# Patient Record
Sex: FEMALE | Race: WHITE | Employment: UNEMPLOYED | URBAN - METROPOLITAN AREA
[De-identification: names, ages, dates, MRNs, and addresses within clinical notes are randomized per-mention and may not be internally consistent; named-entity substitution may affect disease eponyms.]

---

## 2017-11-07 ENCOUNTER — HOSPITAL ENCOUNTER (INPATIENT)
Age: 82
LOS: 3 days | Discharge: NURSING, REHAB OR LONG-TERM CARE | DRG: 641 | End: 2017-11-10
Attending: EMERGENCY MEDICINE | Admitting: HOSPITALIST
Payer: MEDICARE

## 2017-11-07 ENCOUNTER — APPOINTMENT (OUTPATIENT)
Dept: CT IMAGING | Age: 82
DRG: 641 | End: 2017-11-07
Attending: EMERGENCY MEDICINE
Payer: MEDICARE

## 2017-11-07 ENCOUNTER — APPOINTMENT (OUTPATIENT)
Dept: GENERAL RADIOLOGY | Age: 82
DRG: 641 | End: 2017-11-07
Attending: EMERGENCY MEDICINE
Payer: MEDICARE

## 2017-11-07 DIAGNOSIS — I48.20 CHRONIC ATRIAL FIBRILLATION (HCC): ICD-10-CM

## 2017-11-07 DIAGNOSIS — E86.0 SEVERE DEHYDRATION: ICD-10-CM

## 2017-11-07 DIAGNOSIS — C80.1 CANCER (HCC): ICD-10-CM

## 2017-11-07 DIAGNOSIS — R55 SYNCOPE AND COLLAPSE: Primary | ICD-10-CM

## 2017-11-07 DIAGNOSIS — E87.20 LACTIC ACIDOSIS: ICD-10-CM

## 2017-11-07 DIAGNOSIS — D72.828 OTHER ELEVATED WHITE BLOOD CELL (WBC) COUNT: ICD-10-CM

## 2017-11-07 DIAGNOSIS — R11.2 INTRACTABLE VOMITING WITH NAUSEA, UNSPECIFIED VOMITING TYPE: ICD-10-CM

## 2017-11-07 PROBLEM — C78.7 METASTASIS TO LIVER (HCC): Status: ACTIVE | Noted: 2017-11-07

## 2017-11-07 PROBLEM — C16.9 ADENOCARCINOMA OF STOMACH (HCC): Status: ACTIVE | Noted: 2017-11-07

## 2017-11-07 PROBLEM — N39.0 UTI (URINARY TRACT INFECTION): Status: ACTIVE | Noted: 2017-11-07

## 2017-11-07 PROBLEM — E44.0 MODERATE PROTEIN-CALORIE MALNUTRITION (HCC): Status: ACTIVE | Noted: 2017-11-07

## 2017-11-07 LAB
ALBUMIN SERPL-MCNC: 2.6 G/DL (ref 3.4–5)
ALBUMIN/GLOB SERPL: 0.7 {RATIO} (ref 0.8–1.7)
ALP SERPL-CCNC: 176 U/L (ref 45–117)
ALT SERPL-CCNC: 78 U/L (ref 13–56)
ANION GAP SERPL CALC-SCNC: 17 MMOL/L (ref 3–18)
APPEARANCE UR: ABNORMAL
AST SERPL-CCNC: 102 U/L (ref 15–37)
BACTERIA URNS QL MICRO: ABNORMAL /HPF
BASOPHILS # BLD: 0 K/UL (ref 0–0.06)
BASOPHILS NFR BLD: 0 % (ref 0–2)
BILIRUB SERPL-MCNC: 0.3 MG/DL (ref 0.2–1)
BILIRUB UR QL: NEGATIVE
BUN SERPL-MCNC: 27 MG/DL (ref 7–18)
BUN/CREAT SERPL: 29 (ref 12–20)
CALCIUM SERPL-MCNC: 8.7 MG/DL (ref 8.5–10.1)
CHLORIDE SERPL-SCNC: 95 MMOL/L (ref 100–108)
CK MB CFR SERPL CALC: ABNORMAL % (ref 0–4)
CK MB SERPL-MCNC: <1 NG/ML (ref 5–25)
CK SERPL-CCNC: 62 U/L (ref 26–192)
CO2 SERPL-SCNC: 26 MMOL/L (ref 21–32)
COLOR UR: ABNORMAL
CREAT SERPL-MCNC: 0.94 MG/DL (ref 0.6–1.3)
DIFFERENTIAL METHOD BLD: ABNORMAL
EOSINOPHIL # BLD: 0.2 K/UL (ref 0–0.4)
EOSINOPHIL NFR BLD: 1 % (ref 0–5)
EPITH CASTS URNS QL MICRO: ABNORMAL /LPF (ref 0–5)
ERYTHROCYTE [DISTWIDTH] IN BLOOD BY AUTOMATED COUNT: 18.9 % (ref 11.6–14.5)
GLOBULIN SER CALC-MCNC: 3.6 G/DL (ref 2–4)
GLUCOSE SERPL-MCNC: 259 MG/DL (ref 74–99)
GLUCOSE UR STRIP.AUTO-MCNC: NEGATIVE MG/DL
HCT VFR BLD AUTO: 35.4 % (ref 35–45)
HGB BLD-MCNC: 11.1 G/DL (ref 12–16)
HGB UR QL STRIP: NEGATIVE
KETONES UR QL STRIP.AUTO: NEGATIVE MG/DL
LACTATE SERPL-SCNC: 5.4 MMOL/L (ref 0.4–2)
LACTATE SERPL-SCNC: 9.4 MMOL/L (ref 0.4–2)
LEUKOCYTE ESTERASE UR QL STRIP.AUTO: ABNORMAL
LYMPHOCYTES # BLD: 2.8 K/UL (ref 0.9–3.6)
LYMPHOCYTES NFR BLD: 16 % (ref 21–52)
MCH RBC QN AUTO: 28 PG (ref 24–34)
MCHC RBC AUTO-ENTMCNC: 31.4 G/DL (ref 31–37)
MCV RBC AUTO: 89.4 FL (ref 74–97)
MONOCYTES # BLD: 1.1 K/UL (ref 0.05–1.2)
MONOCYTES NFR BLD: 6 % (ref 3–10)
NEUTS SEG # BLD: 13.9 K/UL (ref 1.8–8)
NEUTS SEG NFR BLD: 77 % (ref 40–73)
NITRITE UR QL STRIP.AUTO: NEGATIVE
PH UR STRIP: 8.5 [PH] (ref 5–8)
PLATELET # BLD AUTO: 229 K/UL (ref 135–420)
PMV BLD AUTO: 9.2 FL (ref 9.2–11.8)
POTASSIUM SERPL-SCNC: 3.8 MMOL/L (ref 3.5–5.5)
PROT SERPL-MCNC: 6.2 G/DL (ref 6.4–8.2)
PROT UR STRIP-MCNC: 100 MG/DL
RBC # BLD AUTO: 3.96 M/UL (ref 4.2–5.3)
RBC #/AREA URNS HPF: ABNORMAL /HPF (ref 0–5)
SODIUM SERPL-SCNC: 138 MMOL/L (ref 136–145)
SP GR UR REFRACTOMETRY: 1.02 (ref 1–1.03)
TROPONIN I SERPL-MCNC: 0.09 NG/ML (ref 0–0.06)
UROBILINOGEN UR QL STRIP.AUTO: 1 EU/DL (ref 0.2–1)
WBC # BLD AUTO: 18.1 K/UL (ref 4.6–13.2)
WBC URNS QL MICRO: ABNORMAL /HPF (ref 0–5)

## 2017-11-07 PROCEDURE — 51701 INSERT BLADDER CATHETER: CPT

## 2017-11-07 PROCEDURE — 74011250636 HC RX REV CODE- 250/636: Performed by: EMERGENCY MEDICINE

## 2017-11-07 PROCEDURE — 70450 CT HEAD/BRAIN W/O DYE: CPT

## 2017-11-07 PROCEDURE — 93005 ELECTROCARDIOGRAM TRACING: CPT

## 2017-11-07 PROCEDURE — 96367 TX/PROPH/DG ADDL SEQ IV INF: CPT

## 2017-11-07 PROCEDURE — 87086 URINE CULTURE/COLONY COUNT: CPT | Performed by: EMERGENCY MEDICINE

## 2017-11-07 PROCEDURE — 87186 SC STD MICRODIL/AGAR DIL: CPT | Performed by: EMERGENCY MEDICINE

## 2017-11-07 PROCEDURE — 99285 EMERGENCY DEPT VISIT HI MDM: CPT

## 2017-11-07 PROCEDURE — 83605 ASSAY OF LACTIC ACID: CPT | Performed by: EMERGENCY MEDICINE

## 2017-11-07 PROCEDURE — 96366 THER/PROPH/DIAG IV INF ADDON: CPT

## 2017-11-07 PROCEDURE — 87077 CULTURE AEROBIC IDENTIFY: CPT | Performed by: EMERGENCY MEDICINE

## 2017-11-07 PROCEDURE — 81001 URINALYSIS AUTO W/SCOPE: CPT | Performed by: EMERGENCY MEDICINE

## 2017-11-07 PROCEDURE — 80053 COMPREHEN METABOLIC PANEL: CPT | Performed by: EMERGENCY MEDICINE

## 2017-11-07 PROCEDURE — 74011000258 HC RX REV CODE- 258: Performed by: EMERGENCY MEDICINE

## 2017-11-07 PROCEDURE — 65270000029 HC RM PRIVATE

## 2017-11-07 PROCEDURE — 74011250636 HC RX REV CODE- 250/636: Performed by: HOSPITALIST

## 2017-11-07 PROCEDURE — 96365 THER/PROPH/DIAG IV INF INIT: CPT

## 2017-11-07 PROCEDURE — 85025 COMPLETE CBC W/AUTO DIFF WBC: CPT | Performed by: EMERGENCY MEDICINE

## 2017-11-07 PROCEDURE — 71010 XR CHEST PORT: CPT

## 2017-11-07 PROCEDURE — 82550 ASSAY OF CK (CPK): CPT | Performed by: EMERGENCY MEDICINE

## 2017-11-07 PROCEDURE — 77030005563 HC CATH URETH INT MMGH -A

## 2017-11-07 PROCEDURE — 74011000258 HC RX REV CODE- 258: Performed by: HOSPITALIST

## 2017-11-07 PROCEDURE — 87040 BLOOD CULTURE FOR BACTERIA: CPT | Performed by: EMERGENCY MEDICINE

## 2017-11-07 PROCEDURE — 74011250637 HC RX REV CODE- 250/637: Performed by: HOSPITALIST

## 2017-11-07 PROCEDURE — 96361 HYDRATE IV INFUSION ADD-ON: CPT

## 2017-11-07 RX ORDER — SODIUM CHLORIDE 9 MG/ML
125 INJECTION, SOLUTION INTRAVENOUS CONTINUOUS
Status: DISCONTINUED | OUTPATIENT
Start: 2017-11-07 | End: 2017-11-10 | Stop reason: HOSPADM

## 2017-11-07 RX ORDER — CALCIUM CARBONATE 200(500)MG
1 TABLET,CHEWABLE ORAL 2 TIMES DAILY
COMMUNITY

## 2017-11-07 RX ORDER — SUCRALFATE 1 G/10ML
1 SUSPENSION ORAL 4 TIMES DAILY
Status: DISCONTINUED | OUTPATIENT
Start: 2017-11-07 | End: 2017-11-10 | Stop reason: HOSPADM

## 2017-11-07 RX ORDER — ERYTHROMYCIN ETHYLSUCCINATE 200 MG/5ML
25 SUSPENSION ORAL
Status: DISCONTINUED | OUTPATIENT
Start: 2017-11-07 | End: 2017-11-10 | Stop reason: HOSPADM

## 2017-11-07 RX ORDER — PANTOPRAZOLE SODIUM 40 MG/1
40 TABLET, DELAYED RELEASE ORAL DAILY
Status: DISCONTINUED | OUTPATIENT
Start: 2017-11-08 | End: 2017-11-08

## 2017-11-07 RX ORDER — PROMETHAZINE HYDROCHLORIDE 12.5 MG/1
12.5 TABLET ORAL 2 TIMES DAILY
COMMUNITY

## 2017-11-07 RX ORDER — ENOXAPARIN SODIUM 100 MG/ML
40 INJECTION SUBCUTANEOUS EVERY 24 HOURS
Status: DISCONTINUED | OUTPATIENT
Start: 2017-11-07 | End: 2017-11-10 | Stop reason: HOSPADM

## 2017-11-07 RX ORDER — ONDANSETRON 8 MG/1
8 TABLET, ORALLY DISINTEGRATING ORAL
COMMUNITY

## 2017-11-07 RX ORDER — RABEPRAZOLE SODIUM 20 MG/1
10 TABLET, DELAYED RELEASE ORAL DAILY
COMMUNITY

## 2017-11-07 RX ORDER — URSODIOL 300 MG/1
300 CAPSULE ORAL DAILY
COMMUNITY

## 2017-11-07 RX ORDER — URSODIOL 300 MG/1
300 CAPSULE ORAL DAILY
Status: DISCONTINUED | OUTPATIENT
Start: 2017-11-08 | End: 2017-11-10 | Stop reason: HOSPADM

## 2017-11-07 RX ORDER — SODIUM CHLORIDE 0.9 % (FLUSH) 0.9 %
5-10 SYRINGE (ML) INJECTION AS NEEDED
Status: DISCONTINUED | OUTPATIENT
Start: 2017-11-07 | End: 2017-11-10 | Stop reason: HOSPADM

## 2017-11-07 RX ORDER — SUCRALFATE 1 G/10ML
1 SUSPENSION ORAL 4 TIMES DAILY
COMMUNITY

## 2017-11-07 RX ORDER — SODIUM CHLORIDE 0.9 % (FLUSH) 0.9 %
5-10 SYRINGE (ML) INJECTION EVERY 8 HOURS
Status: DISCONTINUED | OUTPATIENT
Start: 2017-11-07 | End: 2017-11-10 | Stop reason: HOSPADM

## 2017-11-07 RX ORDER — SODIUM CHLORIDE 9 MG/ML
250 INJECTION, SOLUTION INTRAVENOUS ONCE
Status: COMPLETED | OUTPATIENT
Start: 2017-11-07 | End: 2017-11-07

## 2017-11-07 RX ADMIN — CEFTRIAXONE 1 G: 1 INJECTION, POWDER, FOR SOLUTION INTRAMUSCULAR; INTRAVENOUS at 18:57

## 2017-11-07 RX ADMIN — Medication 10 ML: at 18:32

## 2017-11-07 RX ADMIN — SUCRALFATE 1 G: 1 SUSPENSION ORAL at 21:19

## 2017-11-07 RX ADMIN — SODIUM CHLORIDE 250 ML/HR: 900 INJECTION, SOLUTION INTRAVENOUS at 10:00

## 2017-11-07 RX ADMIN — SODIUM CHLORIDE 1000 ML: 900 INJECTION, SOLUTION INTRAVENOUS at 10:08

## 2017-11-07 RX ADMIN — ENOXAPARIN SODIUM 40 MG: 40 INJECTION SUBCUTANEOUS at 18:31

## 2017-11-07 RX ADMIN — SODIUM CHLORIDE 125 ML/HR: 900 INJECTION, SOLUTION INTRAVENOUS at 18:30

## 2017-11-07 RX ADMIN — SODIUM CHLORIDE 250 ML/HR: 900 INJECTION, SOLUTION INTRAVENOUS at 09:57

## 2017-11-07 RX ADMIN — SODIUM CHLORIDE 1578 ML: 900 INJECTION, SOLUTION INTRAVENOUS at 07:34

## 2017-11-07 RX ADMIN — SUCRALFATE 1 G: 1 SUSPENSION ORAL at 18:31

## 2017-11-07 RX ADMIN — SODIUM CHLORIDE 1000 MG: 900 INJECTION, SOLUTION INTRAVENOUS at 08:13

## 2017-11-07 RX ADMIN — PIPERACILLIN SODIUM,TAZOBACTAM SODIUM 4.5 G: 4; .5 INJECTION, POWDER, FOR SOLUTION INTRAVENOUS at 07:37

## 2017-11-07 RX ADMIN — SODIUM CHLORIDE 1000 ML: 900 INJECTION, SOLUTION INTRAVENOUS at 06:11

## 2017-11-07 RX ADMIN — ERYTHROMYCIN ETHYLSUCCINATE 25 MG: 200 GRANULE, FOR SUSPENSION ORAL at 18:57

## 2017-11-07 NOTE — ED NOTES
Checked patients brief. Pt has not voided. Bladder scan performed, pt with less than 200ml urine in bladder.

## 2017-11-07 NOTE — ED NOTES
Pt resting quietly in bed. Warm blankets provided for comfort. Son at bedside with her. Rails up for safety. Call bell within reach. VSS on O2.

## 2017-11-07 NOTE — ROUTINE PROCESS
TRANSFER - IN REPORT:    Verbal report received from Alphonso Garcia RN(name) on Alejandro   being received from ED(unit) for routine progression of care      Report consisted of patients Situation, Background, Assessment and   Recommendations(SBAR). Information from the following report(s) SBAR, Kardex, Intake/Output and MAR was reviewed with the receiving nurse. Opportunity for questions and clarification was provided. Assessment completed upon patients arrival to unit and care assumed.

## 2017-11-07 NOTE — ED PROVIDER NOTES
[address]  EMERGENCY DEPARTMENT HISTORY AND PHYSICAL EXAM       Date: 11/7/2017   Patient Name: Jennifer Pope   YOB: 1932  Medical Record Number: [id number]    History of Presenting Illness     Chief Complaint   Patient presents with    Syncope        History Provided By:  EMS, patient    Additional History: 5:46 AM   Jennifer Pope is a 80 y.o. female who presents to the emergency department via EMS C/O syncope episode for about 20 seconds witnessed by nurse at [facility] onset PTA while walking to the restroom. Associated sxs include diaphoresis, vomiting, diarrhea and pale color to skin. Pt has a feeding tube but states she cannot eat anything because she will vomit. PMHx includes malignant stomach CA (dx'd 2 months ago), hypokalemia and dehydration. PSHx includes peg tube. Pt denies myalgias, arthralgias, abd pain and any other symptoms or complaints. Written by Sasha Bang ED Scribe, as dictated by Kj Giron MD     Primary Care Provider: Shade Gomez MD   Specialist:    Past History     Past Medical History:   Past Medical History:   Diagnosis Date    Dehydration     Dysphagia     Hypokalemia     Malignant neoplasm of fundus of stomach ([address]         Past Surgical History:   Past Surgical History:   Procedure Laterality Date    HX CHOLECYSTECTOMY      HX GI      peg tube    HX ORTHOPAEDIC          Family History:   History reviewed. No pertinent family history. Social History:   Social History   Substance Use Topics    Smoking status: Never Smoker    Smokeless tobacco: Never Used    Alcohol use No        Allergies: Allergies   Allergen Reactions    Codeine Nausea Only    Phenobarbital Rash        Review of Systems   Review of Systems   Constitutional: Positive for diaphoresis. Gastrointestinal: Positive for diarrhea and vomiting. Negative for abdominal pain. Musculoskeletal: Negative for arthralgias and myalgias. Skin: Positive for color change (pale). Neurological: Positive for syncope. All other systems reviewed and are negative. Physical Exam  Vitals:    11/07/17 0730 11/07/17 0745 11/07/17 0800 11/07/17 0815   BP: 110/63 111/70 108/55 103/70   Pulse: (!) 112 (!) 115 (!) 108 (!) 109   Resp: 25 20 23 21   Temp:       SpO2: 99% 99% 99% 99%   Weight:           Physical Exam   Constitutional: She is oriented to person, place, and time. She appears well-developed and well-nourished. No distress. fraile   elderly      HENT:   Head: Normocephalic and atraumatic. Eyes: Pupils are equal, round, and reactive to light. Neck: Neck supple. Cardiovascular: Regular rhythm, S1 normal, S2 normal and normal heart sounds. Tachycardia present. Pulmonary/Chest: Breath sounds normal. No respiratory distress. She has no wheezes. She has no rales. She exhibits no tenderness. Abdominal: Soft. She exhibits no distension and no mass. There is no tenderness. There is no guarding. G-Tube in epigastric area    Musculoskeletal: Normal range of motion. She exhibits no edema or tenderness. No swelling in any extremities. Easily bruising in UE. Neurological: She is alert and oriented to person, place, and time. No cranial nerve deficit. Cranical nerves II-XXII intact. Generalied weakness. Skin: No rash noted. There is pallor. Psychiatric: She has a normal mood and affect. Her behavior is normal. Thought content normal.   Nursing note and vitals reviewed.       Diagnostic Study Results     Labs -      Recent Results (from the past 12 hour(s))   EKG, 12 LEAD, INITIAL    Collection Time: 11/07/17  5:53 AM   Result Value Ref Range    Ventricular Rate 124 BPM    Atrial Rate 124 BPM    P-R Interval 96 ms    QRS Duration 62 ms    Q-T Interval 318 ms    QTC Calculation (Bezet) 456 ms    Calculated P Axis 73 degrees    Calculated R Axis 47 degrees    Calculated T Axis 96 degrees    Diagnosis       Sinus tachycardia with short MT  Marked ST abnormality, possible inferolateral subendocardial injury  Abnormal ECG  No previous ECGs available     CARDIAC PANEL,(CK, CKMB & TROPONIN)    Collection Time: 11/07/17  6:05 AM   Result Value Ref Range    CK 62 26 - 192 U/L    CK - MB <1.0 <3.6 ng/ml    CK-MB Index  0.0 - 4.0 %     CALCULATION NOT PERFORMED WHEN RESULT IS BELOW LINEAR LIMIT    Troponin-I, Qt. 0.09 (H) 0.00 - 0.06 NG/ML   CBC WITH AUTOMATED DIFF    Collection Time: 11/07/17  6:05 AM   Result Value Ref Range    WBC 18.1 (H) 4.6 - 13.2 K/uL    RBC 3.96 (L) 4.20 - 5.30 M/uL    HGB 11.1 (L) 12.0 - 16.0 g/dL    HCT 35.4 35.0 - 45.0 %    MCV 89.4 74.0 - 97.0 FL    MCH 28.0 24.0 - 34.0 PG    MCHC 31.4 31.0 - 37.0 g/dL    RDW 18.9 (H) 11.6 - 14.5 %    PLATELET 000 539 - 811 K/uL    MPV 9.2 9.2 - 11.8 FL    NEUTROPHILS 77 (H) 40 - 73 %    LYMPHOCYTES 16 (L) 21 - 52 %    MONOCYTES 6 3 - 10 %    EOSINOPHILS 1 0 - 5 %    BASOPHILS 0 0 - 2 %    ABS. NEUTROPHILS 13.9 (H) 1.8 - 8.0 K/UL    ABS. LYMPHOCYTES 2.8 0.9 - 3.6 K/UL    ABS. MONOCYTES 1.1 0.05 - 1.2 K/UL    ABS. EOSINOPHILS 0.2 0.0 - 0.4 K/UL    ABS. BASOPHILS 0.0 0.0 - 0.06 K/UL    DF AUTOMATED     METABOLIC PANEL, COMPREHENSIVE    Collection Time: 11/07/17  6:05 AM   Result Value Ref Range    Sodium 138 136 - 145 mmol/L    Potassium 3.8 3.5 - 5.5 mmol/L    Chloride 95 (L) 100 - 108 mmol/L    CO2 26 21 - 32 mmol/L    Anion gap 17 3.0 - 18 mmol/L    Glucose 259 (H) 74 - 99 mg/dL    BUN 27 (H) 7.0 - 18 MG/DL    Creatinine 0.94 0.6 - 1.3 MG/DL    BUN/Creatinine ratio 29 (H) 12 - 20      GFR est AA >60 >60 ml/min/1.73m2    GFR est non-AA 57 (L) >60 ml/min/1.73m2    Calcium 8.7 8.5 - 10.1 MG/DL    Bilirubin, total 0.3 0.2 - 1.0 MG/DL    ALT (SGPT) 78 (H) 13 - 56 U/L    AST (SGOT) 102 (H) 15 - 37 U/L    Alk.  phosphatase 176 (H) 45 - 117 U/L    Protein, total 6.2 (L) 6.4 - 8.2 g/dL    Albumin 2.6 (L) 3.4 - 5.0 g/dL    Globulin 3.6 2.0 - 4.0 g/dL    A-G Ratio 0.7 (L) 0.8 - 1.7     LACTIC ACID Collection Time: 11/07/17  6:05 AM   Result Value Ref Range    Lactic acid 9.4 (HH) 0.4 - 2.0 MMOL/L   URINALYSIS W/ RFLX MICROSCOPIC    Collection Time: 11/07/17  7:18 AM   Result Value Ref Range    Color DARK YELLOW      Appearance CLOUDY      Specific gravity 1.023 1.005 - 1.030      pH (UA) 8.5 (H) 5.0 - 8.0      Protein 100 (A) NEG mg/dL    Glucose NEGATIVE  NEG mg/dL    Ketone NEGATIVE  NEG mg/dL    Bilirubin NEGATIVE  NEG      Blood NEGATIVE  NEG      Urobilinogen 1.0 0.2 - 1.0 EU/dL    Nitrites NEGATIVE  NEG      Leukocyte Esterase SMALL (A) NEG     URINE MICROSCOPIC ONLY    Collection Time: 11/07/17  7:18 AM   Result Value Ref Range    WBC 11 to 20 0 - 5 /hpf    RBC 0 to 2 0 - 5 /hpf    Epithelial cells FEW 0 - 5 /lpf    Bacteria 3+ (A) NEG /hpf   LACTIC ACID    Collection Time: 11/07/17  8:40 AM   Result Value Ref Range    Lactic acid 5.4 (HH) 0.4 - 2.0 MMOL/L       Radiologic Studies -  The following have been ordered and reviewed:  XR CHEST PORT   Final Result   IMPRESSION:        1. No acute intracranial abnormality. 2. Subcortical and periventricular white matter hypoattenuation; a nonspecific  finding, likely reflective of chronic ischemic microvascular change.      As read by the radiologist.      CT HEAD WO CONT   Final Result   IMPRESSION:      1. No acute intracranial abnormality. 2. Subcortical and periventricular white matter hypoattenuation; a nonspecific  finding, likely reflective of chronic ischemic microvascular change. As read by the radiologist.     Medical Decision Making   I am the first provider for this patient. I reviewed the vital signs, available nursing notes, past medical history, past surgical history, family history and social history. Vital Signs-Reviewed the patient's vital signs.    Patient Vitals for the past 12 hrs:   Temp Pulse Resp BP SpO2   11/07/17 0815 - (!) 109 21 103/70 99 %   11/07/17 0800 - (!) 108 23 108/55 99 %   11/07/17 0745 - (!) 115 20 111/70 99 %   11/07/17 0730 - (!) 112 25 110/63 99 %   11/07/17 0715 - 99 - 101/56 -   11/07/17 0710 - (!) 107 22 107/52 (!) 82 %   11/07/17 0705 - (!) 110 30 103/53 97 %   11/07/17 0700 - (!) 106 23 100/57 100 %   11/07/17 0655 - 100 21 101/57 96 %   11/07/17 0650 - (!) 105 27 110/60 98 %   11/07/17 0630 - (!) 114 27 92/58 97 %   11/07/17 0628 - (!) 113 26 (!) 71/47 98 %   11/07/17 0626 - (!) 105 (!) 33 (!) 80/53 -   11/07/17 0624 - (!) 105 (!) 37 95/51 100 %   11/07/17 0615 - (!) 122 (!) 32 94/55 100 %   11/07/17 0600 - (!) 126 26 (!) 81/63 -   11/07/17 0554 97.2 °F (36.2 °C) (!) 124 21 110/44 92 %       Pulse Oximetry Analysis - Normal 92% on RA     Cardiac Monitor:   Rate: 124 bpm  Rhythm: Sinus Tachycardia     EKG interpretation: (EMS)  6:25 AM   Rhythm: sinus tachycardia   Rate: 114 bpm  EKG read by Niyah Araujo MD  at 5:50 AM     EKG interpretation: (Preliminary)  Rhythm: sinus tachycardia with short MD. Rate (approx.): 124 bpm; marked ST abnormality possible inferolateral subendocardial injury    EKG read by Niyah Araujo MD  at 5:53 AM    Old Medical Records: Old medical records. Nursing notes. Ambulance run sheet. Initial impression is syncope. Need to rule out Dehydration, electrolyte abnormalities, metastatic   disease UTI, sepsis      Procedures:   Procedures    ED Course:  5:46 AM  Initial assessment performed. The patients presenting problems have been discussed, and they are in agreement with the care plan formulated and outlined with them. I have encouraged them to ask questions as they arise throughout their visit. SIGN OUT:  7:14 AM  Patient's presentation, labs/imaging and plan of care was reviewed with Dr. Ivan Montana as part of sign out. They will review pending results and reassess pt as part of the plan discussed with the patient.     Dr. Vadim Dye's assistance in completion of this plan is greatly appreciated but it should be noted that I will be the provider of record for this patient. Linda Gregory MD      7:25 AM  Stated that she would be agreeable to resuscitation with fluids and abx should she have worsening sepsis that requires pressers or more invasive management. They would like to reassess the plan since she is a DNR. CONSULT NOTE:   9:42 AM  Lucho Durbin MD spoke with Amanda Crowe MD   Specialty: Hospitalist  Discussed pt's hx, disposition, and available diagnostic and imaging results. Reviewed care plans. Consulting physician agrees with plans as outlined. Accept pt to medical surgery. Written by VAL Veloz, as dictated by Lucho Durbin MD.    Medications Given in the ED:  Medications   vancomycin (VANCOCIN) 1,000 mg in 0.9% sodium chloride (MBP/ADV) 250 mL adv (1,000 mg IntraVENous New Bag 11/7/17 9412)   0.9% sodium chloride infusion (not administered)   sodium chloride 0.9 % bolus infusion 1,000 mL (0 mL IntraVENous IV Completed 11/7/17 7704)   sodium chloride 0.9 % bolus infusion 1,578 mL (0 mL/kg × 52.6 kg IntraVENous IV Completed 11/7/17 2554)   piperacillin-tazobactam (ZOSYN) 4.5 g in 0.9% sodium chloride (MBP/ADV) 100 mL (0 g IntraVENous IV Completed 11/7/17 9943)       ADMISSION NOTE:  9:46 AM  Patient is being admitted to the hospital by Amanda Crowe MD. The results of their tests and reasons for their admission have been discussed with them and/or available family. They convey agreement and understanding for the need to be admitted and for their admission diagnosis. Written by VAL Veloz, as dictated by Lucho Durbin MD.    CONDITIONS ON ADMISSION:  Deep Vein Thrombosis is not present at the time of admission. Thrombosis is not present at the time of admission. Urinary Tract Infection is not present at the time of admission. Pneumonia is not present at the time of admission. MRSA is not present at the time of admission. Wound infection is not present at the time of admission.  Pressure Ulcer is not present at the time of admission. CLINICAL IMPRESSION    1. Syncope and collapse    2. Lactic acidosis    3. Intractable vomiting with nausea, unspecified vomiting type    4. Severe dehydration    5. Chronic atrial fibrillation (HCC)    6. Other elevated white blood cell (WBC) count      Discussion:  80 y.o. F presents for a syncopal event secondary to severe dehydration from her gastric CA and daily vomiting. Has a hx of A-fib and heart rate fluctuates between 90's and 120's however, this improved with IV fluids. Her vitals signs have otherwise been stable. Labs revealed a lactic acid of 9.4 and WBC of 18,000 with no infection source identified. However, she has been covered extensively with vancomycin and Zofran. Lactic acid improved to 5.4 with fluids. Will admit for continued hydration. PLAN: ADMIT TO MEDICAL    _______________________________   Attestations: This note is prepared by Santhosh Kang , acting as a Scribe for Brooke Mathis MD  on 5:46 AM on 11/7/17 . This note is prepared by Rico Kim, acting as a Scribe for MD Brooke Turner MD : The scribe's documentation has been prepared under my direction and personally reviewed by me in its entirety. Jose Luna MD: The scribe's documentation has been prepared under my direction and personally reviewed by me in its entirety.   _______________________________

## 2017-11-07 NOTE — ED TRIAGE NOTES
Pt brought to ED by EMS from [facility] c/c 20 second witnessed syncopal episode. Per EMS caretaker at nursing facility was escorting patient to restroom after tube feeding, reports patient became pale and hypotensive. Upon arrival to ED pt is alert and oriented X4, skin color is pink, pt in NAD, placed on CMx3. Sepsis Screening completed    (xx  )Patient meets SIRS criteria. (  )Patient does not meet SIRS criteria.       SIRS Criteria is achieved when two or more of the following are present   Temperature < 96.8°F (36°C) or > 100.9°F (38.3°C)   Heart Rate > 90 beats per minute   Respiratory Rate > 20 breaths per minute   WBC count > 12,000 or <4,000 or > 10% bands

## 2017-11-07 NOTE — ED NOTES
TRANSFER - OUT REPORT:    Verbal report given to [address] RN(name) on Feliciano Negro  being transferred to Room 317 (unit) for routine progression of care       Report consisted of patients Situation, Background, Assessment and   Recommendations(SBAR). Information from the following report(s) SBAR, Kardex and MAR was reviewed with the receiving nurse. Lines:   Peripheral IV 11/07/17 Right Hand (Active)   Site Assessment Clean, dry, & intact 11/7/2017  8:00 AM   Phlebitis Assessment 0 11/7/2017  8:00 AM   Infiltration Assessment 0 11/7/2017  8:00 AM   Dressing Status Clean, dry, & intact 11/7/2017  8:00 AM   Dressing Type Transparent;Tape 11/7/2017  8:00 AM   Hub Color/Line Status Pink;Flushed;Patent 11/7/2017  8:00 AM   Action Taken Dressing reinforced 11/7/2017  5:55 AM       Peripheral IV 11/07/17 Left Hand (Active)   Site Assessment Clean, dry, & intact 11/7/2017  8:00 AM   Phlebitis Assessment 0 11/7/2017  8:00 AM   Infiltration Assessment 0 11/7/2017  8:00 AM   Dressing Status Clean, dry, & intact 11/7/2017  8:00 AM   Dressing Type Transparent 11/7/2017  8:00 AM   Hub Color/Line Status Blue;Flushed;Patent 11/7/2017  8:00 AM   Action Taken Dressing reinforced 11/7/2017  5:57 AM        Opportunity for questions and clarification was provided.       Patient transported with:   [employer]

## 2017-11-07 NOTE — PROGRESS NOTES
Readmission Risk Assessment: Low Risk and [facility] patients    RRAT Score: 1 - 12    Initial Assessment:Emergency Contact: chart reviewed  Pt admitted via EMS from [facility] for syncope episode,pt diagnosed with severe dehydration,at this time pt waiting on hospital bed,unit cm will follow up and remain available for discharge planning. Pertinent Medical Hx: see chart  PCP/Specialists: Community Services: [address]    DME:     Low Risk Care Transition Plan:  1. Evaluate for Merged with [facility] or [address] coordination of resources  2. Involve patient/caregiver in assessment, planning, education and implement of intervention. 3. CM daily patient care huddles/interdisciplinary rounds. 4. PCP/Specialist appointment within 7 - 10 days made prior to discharge. 5. Facilitate transportation and logistics for follow-up appointments. 6. Handoff to [address] Nurse Navigator or PCP practice.

## 2017-11-07 NOTE — Clinical Note
Status[de-identified] Inpatient [101] Type of Bed: Medical [8] Inpatient Hospitalization Certified Necessary for the Following Reasons: 3. Patient receiving treatment that can only be provided in an inpatient setting (further clarification in H&P documentation) Admitting Diagnosis: Dehydration, severe [[id number]] Admitting Physician: Sunil Crawford Attending Physician: Tena Garcia [[id number]] Estimated Length of Stay: 2 Midnights Discharge Plan[de-identified] Extended Care Facility (e.g. Adult Home, Nursing Home, etc.)

## 2017-11-07 NOTE — ED NOTES
Bedside and Verbal shift change report given to Kim Gómez RN (oncoming nurse) by Coco Radford RN (offgoing nurse). Report included the following information SBAR, Kardex, ED Summary, Procedure Summary, Intake/Output, MAR, Accordion, Recent Results, Med Rec Status, Cardiac Rhythm A fib and Alarm Parameters .

## 2017-11-07 NOTE — ED NOTES
Attempted to call report to [address] [place] [address]. They are unable to take report at this time.

## 2017-11-07 NOTE — H&P
History & Physical    Patient: Haseeb Garcia MRN: [id number]  CSN: [id number]    YOB: 1932  Age: 80 y.o. 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 Due to advance stage of ca she is not a candidate for surgery. Followed by Dr. Tabatha Monsalve. Planned for palliative chemo and radiation after she is nutritionally optimized. Syncopal episode - likely secondary to dehydration, orthostatic hypotension. CT head with no acute findings    Intractable nausea and vomiting - discussed with patient's son who is Gastroenterologist in [facility], he recommended low dose erythromycin and homeopathic medication which his brother will bring to hospital. Will continue with zofran as needed. Dysphagia - secondary to mets to esophagus. She has J tube placed. Continue with tube feeding with jevity 1.2 with goal rate of 60cc. Dehydration - gentle IVF. UTI - start on ceftriaxone and azithromycin. Moderate protein calorie malnutrition. DVT prophylaxis    Patient is DNR. Estimated length of stay : 1-2 days. Discussed with patient's son. Patient is at [facility] [employer] [employer]. He wants patient to go to assisted living at [facility] 118 is to hydrate her and treat UTI, N/V and continue with tube feeding with the goal rate and     CC: syncopal episode       HPI:     Haseeb Garcia is a 80 y.o. 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 Recently discharged from [facility] to [facility].  She had dysphagia and intractable N/V for over 3 weeks. She had J tube placed and is on jevity 1.2 at goal rate of 60 cc/hr. Patient reports that she continued to have N/V after discharge from hospital. Patient reports that this morning she got up to go to bathroom and she had syncopal episode, the next thing she remembers was she is on floor. She vomited later. She denies any chest pain, SOB, fever/chills, LOC of bowel or bladder. She was brought to ER. She denies any other symmptoms. In ER she is noted to be dehydrated. Head CT negative. Her WBC elevated, UA with concern for UTI. She is being admitted for further management. 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 No pertinent family history. Social History     Social History    Marital status:      Spouse name: N/A    Number of children: N/A    Years of education: N/A     Social History Main Topics    Smoking status: Never Smoker    Smokeless tobacco: Never Used    Alcohol use No    Drug use: None    Sexual activity: Not Asked     Other Topics Concern    None     Social History Narrative    None       Prior to Admission medications    Medication Sig Start Date End Date Taking? Authorizing Provider   Lactose-Free Food with Fiber (JEVITY 1.5 SUBHA) 0.06 gram-1.5 kcal/mL liqd Take  by mouth. Yes Kolton Mcgovern MD   HOMEOPATHIC DRUGS (PHOSPHORUS PO) Take  by mouth. Yes Kolton Mcgovern MD   promethazine (PHENERGAN) 12.5 mg tablet Take 12.5 mg by mouth two (2) times a day. Yes Kolton Mcgovern MD   sucralfate (CARAFATE) 100 mg/mL suspension Take 1 g by mouth four (4) times daily. Yes Kolton Mcgovern MD   calcium carbonate (TUMS FRESHERS) 200 mg calcium (500 mg) chew Take 1 Tab by mouth two (2) times a day.    Yes Kolton Mcgovern MD   ursodiol (ACTIGALL) 300 mg capsule Take 300 mg by mouth daily. Yes Kolton Mcgovern MD   ondansetron (ZOFRAN ODT) 8 mg disintegrating tablet Take 8 mg by mouth every eight (8) hours as needed for Nausea. Yes Kolton Mcgovern MD   RABEprazole (ACIPHEX) 20 mg tablet Take 10 mg by mouth daily. Yes Kolton Mcgovern MD       Allergies   Allergen Reactions    Codeine Nausea Only    Phenobarbital Rash       Review of Systems  Gen: No fever, chills, malaise, weight loss/gain. Heent: No headache, rhinorrhea, epistaxis, ear pain, hearing loss, sinus pain, neck pain/stiffness, sore throat. Heart: No chest pain, palpitations, ARIZMENDI, pnd, or orthopnea. Resp: No cough, hemoptysis, wheezing and shortness of breath. GI: see above. : No urinary obstruction, dysuria or hematuria. Derm: No rash, new skin lesion or pruritis. Musc/skeletal: no bone or joint complains. Vasc: No edema, cyanosis or claudication. Endo: No heat/cold intolerance, no polyuria,polydipsia or polyphagia. Neuro: No unilateral weakness, numbness, tingling. No seizures. See above  Heme: No easy bruising or bleeding. Physical Exam:     Physical Exam:  Visit Vitals    /66    Pulse (!) 104    Temp 97.7 °F (36.5 °C)    Resp 20    Wt 52.6 kg (116 lb)    SpO2 100%    O2 Flow Rate (L/min): 2 l/min O2 Device: Nasal cannula    Temp (24hrs), Av.5 °F (36.4 °C), Min:97.2 °F (36.2 °C), Max:97.7 °F (36.5 °C)    701 - 1900  In: 8 [I.V.:]  Out: -    1901 - 700  In: 1000 [I.V.:1000]  Out: -     General:  Awake, cooperative, no distress. Head:  Normocephalic, without obvious abnormality, atraumatic. Eyes:  Conjunctivae/corneas clear, sclera anicteric, PERRL, EOMs intact. Nose: Nares normal. No drainage or sinus tenderness. Throat: Lips, mucosa, and tongue normal.    Neck: Supple, symmetrical, trachea midline, no adenopathy. Lungs:   Clear to auscultation bilaterally.        Heart: Regular rate and rhythm, S1, S2 normal, no murmur, click, rub or gallop. Abdomen: Soft, non-tender. Bowel sounds normal. No masses,  No organomegaly. J tube in place. Extremities: Extremities normal, atraumatic, no cyanosis or edema. Capillary refill normal.   Pulses: 2+ and symmetric all extremities. Skin: Skin color pink, turgor normal. No rashes or lesions   Neurologic: CNII-XII intact. No focal motor or sensory deficit.        Labs Reviewed:    CMP:   Lab Results   Component Value Date/Time     11/07/2017 06:05 AM    K 3.8 11/07/2017 06:05 AM    CL 95 (L) 11/07/2017 06:05 AM    CO2 26 11/07/2017 06:05 AM    AGAP 17 11/07/2017 06:05 AM     (H) 11/07/2017 06:05 AM    BUN 27 (H) 11/07/2017 06:05 AM    CREA 0.94 11/07/2017 06:05 AM    GFRAA >60 11/07/2017 06:05 AM    GFRNA 57 (L) 11/07/2017 06:05 AM    CA 8.7 11/07/2017 06:05 AM    ALB 2.6 (L) 11/07/2017 06:05 AM    TP 6.2 (L) 11/07/2017 06:05 AM    GLOB 3.6 11/07/2017 06:05 AM    AGRAT 0.7 (L) 11/07/2017 06:05 AM    SGOT 102 (H) 11/07/2017 06:05 AM    ALT 78 (H) 11/07/2017 06:05 AM     CBC:   Lab Results   Component Value Date/Time    WBC 18.1 (H) 11/07/2017 06:05 AM    HGB 11.1 (L) 11/07/2017 06:05 AM    HCT 35.4 11/07/2017 06:05 AM     11/07/2017 06:05 AM         Procedures/imaging: see electronic medical records for all procedures/Xrays and details which were not copied into this note but were reviewed prior to creation of Plan        CC: Deonte Jackson MD

## 2017-11-08 LAB
ANION GAP SERPL CALC-SCNC: 7 MMOL/L (ref 3–18)
BUN SERPL-MCNC: 28 MG/DL (ref 7–18)
BUN/CREAT SERPL: 45 (ref 12–20)
CALCIUM SERPL-MCNC: 7.9 MG/DL (ref 8.5–10.1)
CHLORIDE SERPL-SCNC: 109 MMOL/L (ref 100–108)
CO2 SERPL-SCNC: 26 MMOL/L (ref 21–32)
CREAT SERPL-MCNC: 0.62 MG/DL (ref 0.6–1.3)
ERYTHROCYTE [DISTWIDTH] IN BLOOD BY AUTOMATED COUNT: 19.9 % (ref 11.6–14.5)
GLUCOSE SERPL-MCNC: 163 MG/DL (ref 74–99)
HCT VFR BLD AUTO: 27.9 % (ref 35–45)
HGB BLD-MCNC: 8.7 G/DL (ref 12–16)
LACTATE SERPL-SCNC: 2 MMOL/L (ref 0.4–2)
MAGNESIUM SERPL-MCNC: 1.7 MG/DL (ref 1.6–2.6)
MCH RBC QN AUTO: 27.8 PG (ref 24–34)
MCHC RBC AUTO-ENTMCNC: 31.2 G/DL (ref 31–37)
MCV RBC AUTO: 89.1 FL (ref 74–97)
PHOSPHATE SERPL-MCNC: 2.4 MG/DL (ref 2.5–4.9)
PLATELET # BLD AUTO: 172 K/UL (ref 135–420)
PMV BLD AUTO: 8.7 FL (ref 9.2–11.8)
POTASSIUM SERPL-SCNC: 3.5 MMOL/L (ref 3.5–5.5)
RBC # BLD AUTO: 3.13 M/UL (ref 4.2–5.3)
SODIUM SERPL-SCNC: 142 MMOL/L (ref 136–145)
WBC # BLD AUTO: 8.9 K/UL (ref 4.6–13.2)

## 2017-11-08 PROCEDURE — 74011250637 HC RX REV CODE- 250/637: Performed by: HOSPITALIST

## 2017-11-08 PROCEDURE — 74011000258 HC RX REV CODE- 258: Performed by: HOSPITALIST

## 2017-11-08 PROCEDURE — 83605 ASSAY OF LACTIC ACID: CPT | Performed by: HOSPITALIST

## 2017-11-08 PROCEDURE — 85027 COMPLETE CBC AUTOMATED: CPT | Performed by: HOSPITALIST

## 2017-11-08 PROCEDURE — 36415 COLL VENOUS BLD VENIPUNCTURE: CPT | Performed by: HOSPITALIST

## 2017-11-08 PROCEDURE — 83735 ASSAY OF MAGNESIUM: CPT | Performed by: HOSPITALIST

## 2017-11-08 PROCEDURE — 74011250636 HC RX REV CODE- 250/636: Performed by: HOSPITALIST

## 2017-11-08 PROCEDURE — 84100 ASSAY OF PHOSPHORUS: CPT | Performed by: HOSPITALIST

## 2017-11-08 PROCEDURE — 65270000029 HC RM PRIVATE

## 2017-11-08 PROCEDURE — 80048 BASIC METABOLIC PNL TOTAL CA: CPT | Performed by: HOSPITALIST

## 2017-11-08 RX ORDER — PANTOPRAZOLE SODIUM 40 MG/1
40 GRANULE, DELAYED RELEASE ORAL
Status: DISCONTINUED | OUTPATIENT
Start: 2017-11-08 | End: 2017-11-10 | Stop reason: HOSPADM

## 2017-11-08 RX ADMIN — CEFTRIAXONE 1 G: 1 INJECTION, POWDER, FOR SOLUTION INTRAMUSCULAR; INTRAVENOUS at 17:28

## 2017-11-08 RX ADMIN — Medication 10 ML: at 14:00

## 2017-11-08 RX ADMIN — SODIUM CHLORIDE 125 ML/HR: 900 INJECTION, SOLUTION INTRAVENOUS at 02:24

## 2017-11-08 RX ADMIN — ENOXAPARIN SODIUM 40 MG: 40 INJECTION SUBCUTANEOUS at 17:28

## 2017-11-08 RX ADMIN — URSODIOL 300 MG: 300 CAPSULE ORAL at 10:03

## 2017-11-08 RX ADMIN — ERYTHROMYCIN ETHYLSUCCINATE 25 MG: 200 GRANULE, FOR SUSPENSION ORAL at 12:59

## 2017-11-08 RX ADMIN — ERYTHROMYCIN ETHYLSUCCINATE 25 MG: 200 GRANULE, FOR SUSPENSION ORAL at 10:11

## 2017-11-08 RX ADMIN — SODIUM CHLORIDE 125 ML/HR: 900 INJECTION, SOLUTION INTRAVENOUS at 17:28

## 2017-11-08 RX ADMIN — PANTOPRAZOLE SODIUM 40 MG: 40 GRANULE, DELAYED RELEASE ORAL at 12:42

## 2017-11-08 RX ADMIN — Medication 10 ML: at 05:06

## 2017-11-08 RX ADMIN — SUCRALFATE 1 G: 1 SUSPENSION ORAL at 21:28

## 2017-11-08 RX ADMIN — SUCRALFATE 1 G: 1 SUSPENSION ORAL at 17:28

## 2017-11-08 RX ADMIN — SUCRALFATE 1 G: 1 SUSPENSION ORAL at 12:43

## 2017-11-08 RX ADMIN — ERYTHROMYCIN ETHYLSUCCINATE 25 MG: 200 GRANULE, FOR SUSPENSION ORAL at 17:28

## 2017-11-08 RX ADMIN — SUCRALFATE 1 G: 1 SUSPENSION ORAL at 10:03

## 2017-11-08 NOTE — PROGRESS NOTES
1005- Patient in bed this time, family at bedside. AM medications tolerated well. A/O x 4. Lungs clear, abdomen soft and non-distended. Bowel sounds active, 20G IV to right hand and 22 G IV to left hand. No signs of phlebitis or infiltration noted. Skin warm and dry. PEG tube to abdominal area with sutures still in place. Jevity tube feeding infusing at 40 ml/hr. NO gastric residual when checked before am medications. Sutures also to midline abdomen, patient states should be removed this weekend. Patient denies pain or discomfort. Bed placed in lowest position, call bell within reach. Shift summary: Patient complained of no pain this shift, Jevity tube feeding remains at 40 ml/hr as patient is tolerating this rate without vomiting. Patient had 1 episode of vomiting at end of shift (600 ml, dark in color). Patient voiding without difficulty, IV remains in place infusing at 125 ml/hr.

## 2017-11-08 NOTE — PROGRESS NOTES
0003-Resting in bed, denies pain at this time. Jevity 1.2 in place; peg tube patent. No complaints at this time. Bruising noted to arms. Stable. 0003-Assessment complete. Call light and personal items within reach. 0142-Residual checked with Beatrice Michelle RN at bedside; no residual.      0224-Nauseated, emesis. 0333-No change from initial assessment. Stable. 0505-Residual checked with WILFRIDO Loco; no residual.  Feeding increase to 40 from 30. Will continue to monitor for nausea/vomiting. 0630-Resting quietly in bed. Stable. Shift Summary:  Emesis x 1 during shift. Appears to be tolerating the increase in tube feeding. Stable.

## 2017-11-08 NOTE — PROGRESS NOTES
Hospitalist Progress Note    Patient: Marge Solders MRN: [id number]  CSN: [id number]    YOB: 1932  Age: 80 y.o. Sex: female    DOA: 11/7/2017 LOS:  LOS: 1 day                Assessment/Plan     Patient Active Problem List   Diagnosis Code    Dehydration, severe E86.0    Lactic acidosis E87.2    UTI (urinary tract infection) N39.0    Adenocarcinoma of stomach (HCC) C16.9    Metastasis to liver (HCC) C78.7    Moderate protein-calorie malnutrition (HCC) E44.0    Intractable nausea and vomiting R11.2        81 yo female admitted for syncopal episode. 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 Due to advance stage of ca she is not a candidate for surgery. Followed by Dr. Nabor De Guzman. Planned for palliative chemo and radiation after she is nutritionally optimized.      Syncopal episode - likely secondary to dehydration, orthostatic hypotension. CT head with no acute findings     Intractable nausea and vomiting - discussed with patient's son who is Gastroenterologist in [facility], he recommended low dose erythromycin and homeopathic medication which his brother will bring to hospital. Will continue with zofran as needed.      Dysphagia - secondary to mets to esophagus. She has J tube placed. Continue with tube feeding with jevity 1.2 with goal rate of 60cc.      Dehydration - gentle IVF.      UTI -  on ceftriaxone, follow urine cultures.     Moderate protein calorie malnutrition.     DVT prophylaxis     Patient is DNR. Disposition : 1-2 days    Review of systems  General: No fevers or chills. Cardiovascular: No chest pain or pressure. No palpitations. Pulmonary: No shortness of breath. Gastrointestinal: No nausea, vomiting. Physical Exam:  General: Awake, cooperative, no acute distress    HEENT: NC, Atraumatic. PERRLA, anicteric sclerae. Lungs: CTA Bilaterally. No Wheezing/Rhonchi/Rales.   Heart:  Regular  rhythm,  No murmur, No Rubs, No Gallops  Abdomen: Soft, Non distended, Non tender.  +Bowel sounds, PEG in place  Extremities: No c/c/e  Psych:   Not anxious or agitated. Neurologic:  No acute neurological deficit. Vital signs/Intake and Output:  Visit Vitals    /53 (BP 1 Location: Right arm, BP Patient Position: At rest)    Pulse 91    Temp 98 °F (36.7 °C)    Resp 17    Wt 82.6 kg (182 lb 1.6 oz)    SpO2 96%    Breastfeeding No     Current Shift:     Last three shifts:  11/06 1901 - 11/08 0700  In: 4081.8 [P.O.:30; I.V.:4051.8]  Out: 400             Labs: Results:       Chemistry Recent Labs      11/08/17   0406  11/07/17   0605   GLU  163*  259*   NA  142  138   K  3.5  3.8   CL  109*  95*   CO2  26  26   BUN  28*  27*   CREA  0.62  0.94   CA  7.9*  8.7   AGAP  7  17   BUCR  45*  29*   AP   --   176*   TP   --   6.2*   ALB   --   2.6*   GLOB   --   3.6   AGRAT   --   0.7*      CBC w/Diff Recent Labs      11/08/17   0406  11/07/17   0605   WBC  8.9  18.1*   RBC  3.13*  3.96*   HGB  8.7*  11.1*   HCT  27.9*  35.4   PLT  172  229   GRANS   --   77*   LYMPH   --   16*   EOS   --   1      Cardiac Enzymes Recent Labs      11/07/17   0605   CPK  62   CKND1  CALCULATION NOT PERFORMED WHEN RESULT IS BELOW LINEAR LIMIT      Coagulation No results for input(s): PTP, INR, APTT in the last 72 hours. No lab exists for component: INREXT    Lipid Panel No results found for: CHOL, CHOLPOCT, CHOLX, CHLST, CHOLV, 178399, HDL, LDL, LDLC, DLDLP, 458440, VLDLC, VLDL, TGLX, TRIGL, TRIGP, TGLPOCT, CHHD, CHHDX   BNP No results for input(s): BNPP in the last 72 hours.    Liver Enzymes Recent Labs      11/07/17   0605   TP  6.2*   ALB  2.6*   AP  176*   SGOT  102*      Thyroid Studies No results found for: T4, T3U, TSH, TSHEXT     Procedures/imaging: see electronic medical records for all procedures/Xrays and details which were not copied into this note but were reviewed prior to creation of Plan

## 2017-11-08 NOTE — PROGRESS NOTES
Patient was visited by LADAN. Volunteer followed up with patient and/or family and reported no needs to this . Chaplains will continue to follow and will provide pastoral care as needed or requested. Rev.  [facility]  [phone]

## 2017-11-08 NOTE — PROGRESS NOTES
Pt transferred from [facility] at [facility] due to dehydration. Pt with a history of adenocarcinoma of stomach - with metastasis to liver and esophagus. Spoke with pt and her son, Haroldo Brown, regarding plan of care. Pt and family would like pt to return to [facility] if possible upon discharge. Contacted [facility] and spoke with Marya Cavanaugh and the  at [facility] and have been notified they are not able to meet pt's on going medical needs at this time and recommend she return to [facility] upon discharge. [facility] team will continue to follow pt while at [facility] with an anticipation of pt transitioning once medical condition improves. CM continuing to follow and assist with transition back to [facility]. Discharge Reassessment Plan:  High Risk and MSSP/Good Help ACO patients    RRAT Score:  21 or greater    High Risk Care Transition Interventions:  1. Discharge transition plan:  Return to [facility]  2. Involved patient/caregiver in assessment, planning, education and implement of intervention. 3. CM daily patient care huddles/interdisciplinary rounds were completed. 4. PCP/Specialist appointment to be scheduled within 48 hours unless otherwise specified. 5. Facilitated transportation and logistics for follow-up appointments. 6. Facilitated Medication reconciliation  Pharmacy. Date/Time  7. Formal handoff between hospital provider and post-acute provider to transition patient completed. 8. Handoff to [address] [place] [address] Nurse Navigator or PCP practice completed. Discharge follow-up phone call within 2  4 days (NN, [facility], Nursing) CM follow up as assigned. Care Management Interventions  PCP Verified by CM:  Yes  Mode of Transport at Discharge: BLS  Transition of Care Consult (CM Consult): Discharge Planning, SNF  Health Maintenance Reviewed: Yes  Physical Therapy Consult: Yes  Occupational Therapy Consult: Yes  Current Support Network: [address] [place] [address] (From [facility])  Plan discussed with Pt/Family/Caregiver: Yes  Freedom of Choice Offered: Yes  Discharge Location  Discharge Placement: Skilled nursing facility

## 2017-11-08 NOTE — PROGRESS NOTES
Bedside shift change report given to KATLYN Ybarra RN. Report included the following information SBAR, Kardex, Intake/Output and Recent Results.

## 2017-11-08 NOTE — PROGRESS NOTES
Problem: Falls - Risk of  Goal: *Absence of Falls  Document Viky Fall Risk and appropriate interventions in the flowsheet.    Outcome: Progressing Towards Goal  Fall Risk Interventions:  Mobility Interventions: Patient to call before getting OOB, Utilize walker, cane, or other assitive device, Communicate number of staff needed for ambulation/transfer         Medication Interventions: Patient to call before getting OOB, Bed/chair exit alarm    Elimination Interventions: Call light in reach, Patient to call for help with toileting needs, Toilet paper/wipes in reach, Bed/chair exit alarm

## 2017-11-08 NOTE — PROGRESS NOTES
1800: Pt assisted to [facility] voided 300cc cy urine. 1 episode formed stool medium amount. 500cc chocolate colored emesis noted. Son at the bedside to visit. 0800: Tube feeding initiated as ordered. Gurgling sound ausculted. Flushed with lukewarm water 30cc. Noted Tube broken at connection site but no leakage. Started at [employer] [employer] with a goal of 60 cc/hr. Well tolerated. 2200: TF increase to 30 cc/hr Pt tolerating without any issues.

## 2017-11-08 NOTE — PROGRESS NOTES
INITIAL NUTRITION ASSESSMENT     RECOMMENDATIONS/PLAN:   Order Phos and Mg labs  Change Tube Feed orders to meet at home regimen- Recc Jevity 1.2 @ 75ml/hr to provide total kcal 1980kcal, 92g PRO, 1332ml H20, 280g CHO, 65g Fat  Recc 160ml Free H20 Flushes Q6  Recc starting tube feed at 20ml/hr and increase by 5ml/hr Q 4 until goal rate is met   Monitor Tube Feed tolerance, labs/lyters, skin integrity, wt, fluid status, BM  REASON FOR ASSESSMENT:   []    [x] Enteral/Parenteral Nutrition PTA     NUTRITION ASSESSMENT:   Client History: 80 yrs old Female admitted with dehydration. Per MD note pt has dysphagia and intractable N/V for over 3 weeks. Pt had J tube placed and at home she is on Jevity 1.5 @ 60ml/hr. During hospitalization will change to Jevity 1.2 to meet estimated needs. PMHx: dehydration, dysphagia, hypokalemia, malignant neoplasm of fundus of stomach    Cultural/Scientology Food Preferences: None Identified    FOOD/NUTRITION HISTORY  Diet History:  Per MD \"She had dysphagia and intractable N/V for over 3 weeks. She had J tube placed and is on jevity 1.2 at goal rate of 60 cc/hr. \"   Food Allergies:  [x] NKFA      Pertinent PTA Medications: Jevity 1.5 Lexa, phosphorus, carafate, tums,       NUTRITION INTAKE   Diet Order:  Tube Feed      Average PO Intake:       No data found. Pertinent Medications:  [x] Reviewed; protonix, carafate     Insulin:  [] SSI  [] Pre-meal   []  Basal   [] Drip  [x] None  Pt expected to meet estimated nutrient needs through next review:          []  Yes     [x] No; Tube Feed is not at goal rate currently and does not meet estimated needs  ANTHROPOMETRICS  Height:  5'2       Weight: 82.6 kg (182 lb 1.6 oz)    BMI:   33.5kg/m^2  -  obese (30%-39.9% BMI)        Weight change: pt was 117# on 11/6/17 currently pt is 182# ?  Accuracy of chart wt                                  Comparison to Reference Standards:  IBW: 110lbs      %IBW: 165%      AdjBW: 58.2 kg    NUTRITION-FOCUSED PHYSICAL ASSESSMENT  Skin: No pressure injury noted. GI: +Bm 11/7/17    BIOCHEMICAL DATA & MEDICAL TESTS  Pertinent Labs:  [x] Reviewed; Ca-7.9     NUTRITION PRESCRIPTION  Calories: 4173-4658 kcal/day based on 25-35kcal/kg adjBW  Protein:  g/day based on 1.0-1.5g/kg  Fluid: 6016-4546 ml/day based on 1 kcal/ml      NUTRITION DIAGNOSES:   1. Inadequate oral intake related to dysphagia as evidence by J-tube w/ EN feedings. NUTRITION INTERVENTIONS:   INTERVENTIONS:        GOALS:  1. Formula-Recc Jevity 1.2 @ 75ml/hr to provide total kcal 1980kcal, 92g PRO, 1332ml H20, 280g CHO, 65g Fat   1. Meet tube feed goal rate by next review 3 days             LEARNING NEEDS (Diet, Supplementation, Food/Nutrient-Drug Interaction):   [x] None Identified  [] Inpatient education provided/documented    [] Identified and patient:  [] Declined     [] Was not appropriate/indicated  NUTRITION MONITORING /EVALUATION:   Follow PO intake  Monitor wt  Monitor renal labs, electrolytes, fluid status    [] Participated in Interdisciplinary Rounds  [x] [facility] Reviewed/Documented  DISCHARGE NUTRITION RECOMMENDATIONS ADDRESSED:     [x] Yes- recommended Tube Feed-Jevity 1.5 @ 60ml/hr diet    NUTRITION RISK:     [x]  At risk                     []  Not currently at risk     Will follow-up per policy.   Rex Cranker, RD  PAGER:  [phone]

## 2017-11-08 NOTE — ROUTINE PROCESS
Bedside and Verbal shift change report given to Damari Oglesby RN (oncoming nurse) b Jeffy Corona RN (offgoing nurse). Report included the following information SBAR and Kardex.

## 2017-11-08 NOTE — PROGRESS NOTES
conducted an initial consultation and Spiritual Assessment for Haseeb Garcia, who is a 80 y.o.,female. Patients Primary Language is: Geraldine Hale. According to the patients EMR Yazidism Affiliation is: Adventist. The reason the Patient came to the hospital is:   Patient Active Problem List    Diagnosis Date Noted    Dehydration, severe 11/07/2017    Lactic acidosis 11/07/2017    UTI (urinary tract infection) 11/07/2017    Adenocarcinoma of stomach ([facility] 11/07/2017    Metastasis to liver ([facility] [facility] 11/07/2017    Moderate protein-calorie malnutrition ([facility] 11/07/2017    Intractable nausea and vomiting 11/07/2017        The  provided the following Interventions:  Initiated a relationship of care and support. Explored issues of breanne, spirituality and/or Voodoo needs while hospitalized. Listened empathically. Provided chaplaincy education. Provided information about Spiritual Care Services. Offered prayer and assurance of continued prayers on patient's behalf. Chart reviewed. The following outcomes were achieved:  Patient shared some information about their medical narrative and spiritual journey/beliefs. Patient processed feeling about current hospitalization. Patient and Family expressed gratitude for the 's visit. Assessment:  Patient did not indicate any spiritual or Voodoo issues which require Spiritual Care Services interventions at this time. Patient does not have any Voodoo/cultural needs that will affect patients preferences in health care. Plan:  Chaplains will continue to follow and will provide pastoral care on an as needed or requested basis.  recommends bedside caregivers page  on duty if patient shows signs of acute spiritual or emotional distress. GAYLE Charles. GenSpera  [phone]

## 2017-11-08 NOTE — ROUTINE PROCESS
Bedside and Verbal shift change report given to Sowmya Bello RN (oncoming nurse) by Kumar Berrios RN (offgoing nurse). Report included the following information SBAR and Kardex.

## 2017-11-09 ENCOUNTER — APPOINTMENT (OUTPATIENT)
Dept: INTERVENTIONAL RADIOLOGY/VASCULAR | Age: 82
DRG: 641 | End: 2017-11-09
Attending: RADIOLOGY
Payer: MEDICARE

## 2017-11-09 LAB
ANION GAP SERPL CALC-SCNC: 10 MMOL/L (ref 3–18)
BACTERIA SPEC CULT: ABNORMAL
BUN SERPL-MCNC: 21 MG/DL (ref 7–18)
BUN/CREAT SERPL: 43 (ref 12–20)
CALCIUM SERPL-MCNC: 7.7 MG/DL (ref 8.5–10.1)
CHLORIDE SERPL-SCNC: 109 MMOL/L (ref 100–108)
CO2 SERPL-SCNC: 26 MMOL/L (ref 21–32)
CREAT SERPL-MCNC: 0.49 MG/DL (ref 0.6–1.3)
ERYTHROCYTE [DISTWIDTH] IN BLOOD BY AUTOMATED COUNT: 19.8 % (ref 11.6–14.5)
GLUCOSE SERPL-MCNC: 144 MG/DL (ref 74–99)
HCT VFR BLD AUTO: 27 % (ref 35–45)
HGB BLD-MCNC: 8.3 G/DL (ref 12–16)
MAGNESIUM SERPL-MCNC: 1.9 MG/DL (ref 1.6–2.6)
MCH RBC QN AUTO: 27.9 PG (ref 24–34)
MCHC RBC AUTO-ENTMCNC: 30.7 G/DL (ref 31–37)
MCV RBC AUTO: 90.6 FL (ref 74–97)
PHOSPHATE SERPL-MCNC: 2 MG/DL (ref 2.5–4.9)
PLATELET # BLD AUTO: 169 K/UL (ref 135–420)
PMV BLD AUTO: 9.5 FL (ref 9.2–11.8)
POTASSIUM SERPL-SCNC: 2.8 MMOL/L (ref 3.5–5.5)
RBC # BLD AUTO: 2.98 M/UL (ref 4.2–5.3)
SERVICE CMNT-IMP: ABNORMAL
SODIUM SERPL-SCNC: 145 MMOL/L (ref 136–145)
WBC # BLD AUTO: 7.8 K/UL (ref 4.6–13.2)

## 2017-11-09 PROCEDURE — 74011000258 HC RX REV CODE- 258: Performed by: HOSPITALIST

## 2017-11-09 PROCEDURE — 97535 SELF CARE MNGMENT TRAINING: CPT

## 2017-11-09 PROCEDURE — 84100 ASSAY OF PHOSPHORUS: CPT | Performed by: HOSPITALIST

## 2017-11-09 PROCEDURE — 80048 BASIC METABOLIC PNL TOTAL CA: CPT | Performed by: HOSPITALIST

## 2017-11-09 PROCEDURE — 74011250637 HC RX REV CODE- 250/637: Performed by: INTERNAL MEDICINE

## 2017-11-09 PROCEDURE — 83735 ASSAY OF MAGNESIUM: CPT | Performed by: HOSPITALIST

## 2017-11-09 PROCEDURE — 65270000029 HC RM PRIVATE

## 2017-11-09 PROCEDURE — 74011636320 HC RX REV CODE- 636/320: Performed by: RADIOLOGY

## 2017-11-09 PROCEDURE — 74011250636 HC RX REV CODE- 250/636: Performed by: HOSPITALIST

## 2017-11-09 PROCEDURE — 36415 COLL VENOUS BLD VENIPUNCTURE: CPT | Performed by: HOSPITALIST

## 2017-11-09 PROCEDURE — 3C1ZX8Z IRRIGATION OF INDWELLING DEVICE USING IRRIGATING SUBSTANCE, EXTERNAL APPROACH: ICD-10-PCS | Performed by: RADIOLOGY

## 2017-11-09 PROCEDURE — C1769 GUIDE WIRE: HCPCS

## 2017-11-09 PROCEDURE — 74011250637 HC RX REV CODE- 250/637: Performed by: HOSPITALIST

## 2017-11-09 PROCEDURE — 97161 PT EVAL LOW COMPLEX 20 MIN: CPT

## 2017-11-09 PROCEDURE — 97167 OT EVAL HIGH COMPLEX 60 MIN: CPT

## 2017-11-09 PROCEDURE — 85027 COMPLETE CBC AUTOMATED: CPT | Performed by: HOSPITALIST

## 2017-11-09 PROCEDURE — 97116 GAIT TRAINING THERAPY: CPT

## 2017-11-09 RX ORDER — POTASSIUM CHLORIDE 20 MEQ/1
40 TABLET, EXTENDED RELEASE ORAL 3 TIMES DAILY
Status: DISCONTINUED | OUTPATIENT
Start: 2017-11-09 | End: 2017-11-09 | Stop reason: ALTCHOICE

## 2017-11-09 RX ORDER — SODIUM CHLORIDE 9 MG/ML
500 INJECTION, SOLUTION INTRAVENOUS
Status: DISPENSED | OUTPATIENT
Start: 2017-11-09 | End: 2017-11-10

## 2017-11-09 RX ORDER — POTASSIUM CHLORIDE 20MEQ/15ML
40 LIQUID (ML) ORAL 3 TIMES DAILY
Status: DISCONTINUED | OUTPATIENT
Start: 2017-11-09 | End: 2017-11-10 | Stop reason: HOSPADM

## 2017-11-09 RX ADMIN — POTASSIUM CHLORIDE 40 MEQ: 20 SOLUTION ORAL at 22:42

## 2017-11-09 RX ADMIN — IOPAMIDOL 9 ML: 612 INJECTION, SOLUTION INTRAVENOUS at 15:00

## 2017-11-09 RX ADMIN — SUCRALFATE 1 G: 1 SUSPENSION ORAL at 18:31

## 2017-11-09 RX ADMIN — CEFTRIAXONE 1 G: 1 INJECTION, POWDER, FOR SOLUTION INTRAMUSCULAR; INTRAVENOUS at 18:31

## 2017-11-09 RX ADMIN — POTASSIUM CHLORIDE 40 MEQ: 20 TABLET, EXTENDED RELEASE ORAL at 08:53

## 2017-11-09 RX ADMIN — ERYTHROMYCIN ETHYLSUCCINATE 25 MG: 200 GRANULE, FOR SUSPENSION ORAL at 12:49

## 2017-11-09 RX ADMIN — SODIUM CHLORIDE 125 ML/HR: 900 INJECTION, SOLUTION INTRAVENOUS at 02:55

## 2017-11-09 RX ADMIN — Medication 10 ML: at 14:00

## 2017-11-09 RX ADMIN — URSODIOL 300 MG: 300 CAPSULE ORAL at 08:53

## 2017-11-09 RX ADMIN — SUCRALFATE 1 G: 1 SUSPENSION ORAL at 08:53

## 2017-11-09 RX ADMIN — ENOXAPARIN SODIUM 40 MG: 40 INJECTION SUBCUTANEOUS at 18:31

## 2017-11-09 RX ADMIN — Medication 10 ML: at 22:43

## 2017-11-09 RX ADMIN — SODIUM CHLORIDE 125 ML/HR: 900 INJECTION, SOLUTION INTRAVENOUS at 17:11

## 2017-11-09 RX ADMIN — PANTOPRAZOLE SODIUM 40 MG: 40 GRANULE, DELAYED RELEASE ORAL at 06:55

## 2017-11-09 RX ADMIN — ERYTHROMYCIN ETHYLSUCCINATE 25 MG: 200 GRANULE, FOR SUSPENSION ORAL at 08:53

## 2017-11-09 RX ADMIN — SUCRALFATE 1 G: 1 SUSPENSION ORAL at 12:49

## 2017-11-09 RX ADMIN — POTASSIUM CHLORIDE 40 MEQ: 20 SOLUTION ORAL at 15:41

## 2017-11-09 RX ADMIN — SUCRALFATE 1 G: 1 SUSPENSION ORAL at 22:42

## 2017-11-09 NOTE — PROGRESS NOTES
Problem: Mobility Impaired (Adult and Pediatric)  Goal: *Acute Goals and Plan of Care (Insert Text)  Physical Therapy Goals LT/ST  Initiated 11/9/2017 and to be accomplished within 3-5 day(s)  1. Patient will move from supine <> sit with S/mod I in prep for out of bed activity and change of position. 2.  Patient will perform sit<> stand with S with LRAD in prep for transfers/ambulation. 3.  Patient will transfer from bed <> chair with S with LRAD for time up in chair for completion of ADL activity. 4.  Patient will ambulate 150 feet with S/LRAD for improved functional mobility/safe discharge. Outcome: Progressing Towards Goal  physical Therapy EVALUATION    Patient: Alejandro Nayak (26 y.o. female)  Date: 11/9/2017  Primary Diagnosis: Dehydration, severe  Dehydration, severe  Lactic acidosis  Precautions:Fall (PEG tube)    ASSESSMENT :  Based on the objective data described below, the patient is an 79 yo F who presents with decreased independence in functional mobility with regard to bed mobility, transfers, gait, balance and activity tolerance during current admission for diagnosis as noted above. 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 Pt reports she was recently receiving PT at [facility] prior to admission and was ambulating without assistive device. Pt requiring min/CGA for ambulation 68ft in irving (CGA when using irving rail). Pt with slow anastasiia and decreased foot clearance. Fatigued following above. Returned to room and left up in chair with nurse present attending to PEG tube. Patient reports pain 0/10 throughout session. Pt left with all needs in reach and advised to notify nurse for assistance when ready to return to bed. Recommend SNF to upon discharge pending medical progress. Pt Education: pt educated in safety/technique during functional mobility tasks     Patient will benefit from skilled intervention to address the above impairments.   Patients rehabilitation potential is considered to be Good  Factors which may influence rehabilitation potential include:   []         None noted  []         Mental ability/status  [x]         Medical condition  []         Home/family situation and support systems  []         Safety awareness  []         Pain tolerance/management  []         Other:      PLAN :  Recommendations and Planned Interventions:  [x]           Bed Mobility Training             []    Neuromuscular Re-Education  [x]           Transfer Training                   []    Orthotic/Prosthetic Training  [x]           Gait Training                          []    Modalities  [x]           Therapeutic Exercises          []    Edema Management/Control  [x]           Therapeutic Activities            [x]    Patient and Family Training/Education  []           Other (comment):    Frequency/Duration: Patient will be followed by physical therapy 1-2 times per day to address goals. Discharge Recommendations: SNF  Further Equipment Recommendations for Discharge: to be determined     SUBJECTIVE:   Patient stated I've been pretty busy today.     OBJECTIVE DATA SUMMARY:     Past Medical History:   Diagnosis Date    Dehydration     Dysphagia     Hypokalemia     Malignant neoplasm of fundus of stomach (HCC)      Past Surgical History:   Procedure Laterality Date    HX CHOLECYSTECTOMY      HX GI      peg tube    HX ORTHOPAEDIC       Barriers to Learning/Limitations: None  Compensate with: N/A  Prior Level of Function/Home Situation: reports ambulating without AD while at [facility] recently  [address]: [facility] Name: [facility]  # Steps to Enter: 0  One/Two Story Residence: Two story  # of Interior Steps: 12  Interior Rails: Both  Lift Chair Available: No  Living Alone: No (reports spouse is resident of [facility] as well)  Support Systems: Assisted living (son states desire is for pt to return to [facility] if able)  Patient Expects to be Discharged to[de-identified] Assisted living  Current DME Used/Available at Home: None  Critical Behavior:  Neurologic State: Alert; Appropriate for age  Orientation Level: Oriented X4  Cognition: Follows commands; Appropriate safety awareness; Appropriate for age attention/concentration; Appropriate decision making  Safety/Judgement: Awareness of environment; Fall prevention  Psychosocial  Patient Behaviors: Calm; Cooperative  Family  Behaviors: Calm;Supportive (son present at start of session)  Purposeful Interaction: Yes  Pt Identified Daily Priority: Clinical issues (comment); Communication issues (comment)  Caritas Process: Establish trust;Teaching/learning; Attend basic human needs;Create healing environment  Caring Interventions: Reassure  Reassure: Informing; Acceptance;Caring rounds  Skin Condition/Temp: Fragile; Warm;Dry  Family  Behaviors: Calm;Supportive (son present at start of session)  Skin Integrity: Incision (comment) (midline incision - post PEG placement)  Skin Integumentary  Skin Color: Appropriate for ethnicity  Skin Condition/Temp: Fragile; Warm;Dry  Skin Integrity: Incision (comment) (midline incision - post PEG placement)  Strength:    Strength: Generally decreased, functional  Tone & Sensation:   Tone: Normal  Sensation: Intact (though reports numb feeling R toes3-5since old injury)  Range Of Motion:  AROM: Within functional limits  Functional Mobility:  Bed Mobility:  Supine to Sit: Supervision  Scooting: Supervision  Transfers:  Sit to Stand: Contact guard assistance  Stand to Sit: Contact guard assistance  Bed to Chair: Minimum assistance  Balance:   Sitting: Intact  Standing: Intact; With support  Ambulation/Gait Training:  Distance (ft): 68 Feet (ft)  Assistive Device: Gait belt (placed pectorally)  Ambulation - Level of Assistance: Minimal assistance;Contact guard assistance  Gait Description (WDL): Exceptions to WDL  Gait Abnormalities: Decreased step clearance  Speed/Estelita: Slow  Step Length: Right shortened;Left shortened  Interventions: Safety awareness training;Verbal cues  Therapeutic Exercises:   Educated in alternating LAQ and AP done while seated in chair at bedside  Pain:  Pain Scale 1: Numeric (0 - 10)  Pain Intensity 1: 0  Activity Tolerance:   Fair   Please refer to the flowsheet for vital signs taken during this treatment. After treatment:   [x]         Patient left in no apparent distress sitting up in chair  []         Patient left in no apparent distress in bed  [x]         Call bell left within reach  [x]         Nursing present  []         Caregiver present  []         Bed alarm activated    COMMUNICATION/EDUCATION:   [x]         Fall prevention education was provided and the patient/caregiver indicated understanding. [x]         Patient/family have participated as able in goal setting and plan of care. [x]         Patient/family agree to work toward stated goals and plan of care. []         Patient understands intent and goals of therapy, but is neutral about his/her participation. []         Patient is unable to participate in goal setting and plan of care. Eval Complexity: History: HIGH Complexity :3+ comorbidities / personal factors will impact the outcome/ POC Exam:LOW Complexity : 1-2 Standardized tests and measures addressing body structure, function, activity limitation and / or participation in recreation  Presentation: LOW Complexity : Stable, uncomplicated  Clinical Decision Making:Low Complexity amb >30ft with min HHA/CGA with use of irving rail Overall Complexity:LOW     G-Codes (GP)  Mobility  [id number] Current  CJ= 20-39%   Goal  CI= 1-19%  The severity rating is based on the functional mobility assessment.     Thank you for this referral.  Robinson Reed PT   Time Calculation: 23 mins    Note Addended for Robinson Reed PT by Abundio Hansen PT, DPT

## 2017-11-09 NOTE — PROGRESS NOTES
Hospitalist Progress Note    Patient: Wes Hurtado MRN: [id number]  CSN: [id number]    YOB: 1932  Age: 80 y.o. Sex: female    DOA: 11/7/2017 LOS:  LOS: 2 days                Assessment/Plan     Patient Active Problem List   Diagnosis Code    Dehydration, severe E86.0    Lactic acidosis E87.2    UTI (urinary tract infection) N39.0    Adenocarcinoma of stomach (HCC) C16.9    Metastasis to liver (HCC) C78.7    Moderate protein-calorie malnutrition (HCC) E44.0    Intractable nausea and vomiting R11.2        79 yo female admitted for syncopal episode. 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 Due to advance stage of ca she is not a candidate for surgery. Followed by Dr. Steve Romero. Planned for palliative chemo and radiation after she is nutritionally optimized.      Syncopal episode - likely secondary to dehydration, orthostatic hypotension. CT head with no acute findings     Intractable nausea and vomiting - discussed with patient's son who is Gastroenterologist in [facility], he recommended low dose erythromycin and homeopathic medication which his brother will bring to hospital. Will continue with zofran as needed.      Dysphagia - secondary to mets to esophagus. She has J tube placed. Continue with tube feeding with jevity 1.2 with goal rate of 60cc.      Dehydration - gentle IVF.      UTI -  on ceftriaxone, E-faecalis on urine cultures.     Moderate protein calorie malnutrition. PEG tube clogged, IR help appreciated.     DVT prophylaxis     Patient is DNR. Disposition : 1-2 days    Review of systems  General: No fevers or chills. Cardiovascular: No chest pain or pressure. No palpitations. Pulmonary: No shortness of breath. Gastrointestinal: No nausea, vomiting. Physical Exam:  General: Awake, cooperative, no acute distress    HEENT: NC, Atraumatic. PERRLA, anicteric sclerae. Lungs: CTA Bilaterally. No Wheezing/Rhonchi/Rales.   Heart:  Regular rhythm,  No murmur, No Rubs, No Gallops  Abdomen: Soft, Non distended, Non tender.  +Bowel sounds, PEG in place  Extremities: No c/c/e  Psych:   Not anxious or agitated. Neurologic:  No acute neurological deficit. Vital signs/Intake and Output:  Visit Vitals    /58 (BP 1 Location: Left arm, BP Patient Position: At rest)    Pulse 85    Temp 98.5 °F (36.9 °C)    Resp 20    Wt 82.6 kg (182 lb 1.6 oz)    SpO2 94%    Breastfeeding No     Current Shift:  11/09 0701 - 11/09 1900  In: 60   Out: -   Last three shifts:  11/07 1901 - 11/09 0700  In: 1653.8 [P.O.:30; I.V.:1623.8]  Out: 400             Labs: Results:       Chemistry Recent Labs      11/09/17 0316 11/08/17   0406  11/07/17   0605   GLU  144*  163*  259*   NA  145  142  138   K  2.8*  3.5  3.8   CL  109*  109*  95*   CO2  26  26  26   BUN  21*  28*  27*   CREA  0.49*  0.62  0.94   CA  7.7*  7.9*  8.7   AGAP  10  7  17   BUCR  43*  45*  29*   AP   --    --   176*   TP   --    --   6.2*   ALB   --    --   2.6*   GLOB   --    --   3.6   AGRAT   --    --   0.7*      CBC w/Diff Recent Labs      11/09/17 0316 11/08/17   0406  11/07/17   0605   WBC  7.8  8.9  18.1*   RBC  2.98*  3.13*  3.96*   HGB  8.3*  8.7*  11.1*   HCT  27.0*  27.9*  35.4   PLT  169  172  229   GRANS   --    --   77*   LYMPH   --    --   16*   EOS   --    --   1      Cardiac Enzymes Recent Labs      11/07/17   0605   CPK  62   CKND1  CALCULATION NOT PERFORMED WHEN RESULT IS BELOW LINEAR LIMIT      Coagulation No results for input(s): PTP, INR, APTT in the last 72 hours. No lab exists for component: INREXT, INREXT    Lipid Panel No results found for: CHOL, CHOLPOCT, CHOLX, CHLST, CHOLV, 994372, HDL, LDL, LDLC, DLDLP, 290650, VLDLC, VLDL, TGLX, TRIGL, TRIGP, TGLPOCT, CHHD, CHHDX   BNP No results for input(s): BNPP in the last 72 hours.    Liver Enzymes Recent Labs      11/07/17   0605   TP  6.2*   ALB  2.6*   AP  176*   SGOT  102*      Thyroid Studies No results found for: T4, T3U, TSH, TSHEXT, TSHEXT     Procedures/imaging: see electronic medical records for all procedures/Xrays and details which were not copied into this note but were reviewed prior to creation of Plan

## 2017-11-09 NOTE — PROGRESS NOTES
Problem: Falls - Risk of  Goal: *Absence of Falls  Document Viky Fall Risk and appropriate interventions in the flowsheet.    Outcome: Progressing Towards Goal  Fall Risk Interventions:  Mobility Interventions: Patient to call before getting OOB         Medication Interventions: Assess postural VS orthostatic hypotension, Patient to call before getting OOB, Teach patient to arise slowly    Elimination Interventions: Call light in reach, Patient to call for help with toileting needs

## 2017-11-09 NOTE — PROGRESS NOTES
D/C Plan: [facility] (when medically stable)    Met with pt's son, Smith Henriqeuz ([phone]), and notified him [facility] unable to meet pt's medical needs at this time and recommend pt returning to [facility] upon discharge from the hospital.  Pt's is aware [facility] will continue to follow/monitor pt at [facility] and will work with this facility to transition pt to [address] once medically stable. Please assess pt for need for medical transport. If medical transport is needed please arrange to [facility] ([address] [place] [address]). Please call report to [phone] to ensure a safe transition. Please include all hard scripts for controlled substances, med rec and dc summary in packet. Please medicate for pain prior to dc if possible and needed to help offset delay when patient first arrives to facility.

## 2017-11-09 NOTE — BRIEF OP NOTE
BRIEF OPERATIVE NOTE        G-J tube clog was opened using stiff amplatz wire and forceful contrast flush. Suspect pill fragment clogging distal catheter. Recommend adequate post medication administration flush.     ..      Radha Campos MD  Vascular & Interventional Radiology  [address] [place] [address],[facility]  11/9/2017

## 2017-11-09 NOTE — PROGRESS NOTES
19:55 Assessment completed. Lungs are clear bilat. Abd remains soft,non-tender with + BS. PEG tube is patent with feeding @ 40 mls/hr. 0 residual noted. Resting quietly in bed waitng for son to call. Voiding per BSC w/o difficulty. 22:50 Shift assessment completed. See nsg flow sheet for details. TF changed & rate increased to 50mls/hr w/o any c/o of nausea. Voiding per BSC w/o difficulty. 02:55 Reassessed with 0 changes noted. Resting quietly in bed with eyes closed between rounds. 07:25 Bedside and Verbal shift change report given to [id number] Alia Coylewcarson (oncoming nurse) by Yumiko Saglado RN (offgoing nurse). Report included the following information SBAR.

## 2017-11-09 NOTE — PROGRESS NOTES
Problem: Self Care Deficits Care Plan (Adult)  Goal: *Acute Goals and Plan of Care (Insert Text)  Occupational Therapy Goals  Initiated 11/9/2017 within 7 day(s). 1.  Patient will perform lower body dressing with  supervision/set-up while sitting on EOB  2. Patient will perform grooming with supervision/set-up while standing at sink. 3.  Patient will perform toilet transfers with  supervision/set-up. 4.  Patient will perform all aspects of toileting with  supervision/set-up. 5.  Patient will perform upper extremity therapeutic exercise/activities with  supervision/set-up for 15 minutes. 6.  Patient will utilize energy conservation techniques during functional activities with 1 verbal cue(s). Outcome: Resolved/Met Date Met: 11/09/17  Occupational Therapy EVALUATION/discharge    Patient: Naomi Ortiz (64 y.o. female)  Date: 11/9/2017  Primary Diagnosis: Dehydration, severe  Dehydration, severe  Lactic acidosis        Precautions:  Skin, Fall, Aspiration (PEG)    ASSESSMENT AND RECOMMENDATIONS:  Based on the objective data described below, the patient presents with ability to perform ADL tasks at baseline level. Patient highly motivated for ADLs. Pateint reports she feels she's gotten weak laying in the bed and with the PEG complications. Pt reports normally had lived in 2 story home completely independent, but found out she had cancer and had PEG placed 2 weeks ago and sent to SNF/Rehab. She states her goal is to go to [facility] (a room across the irving from her spouse whom is already there). She wants to make sure she is as independent as possible. Pt able to stand at sink 3-4 minutes. Pt able to stand at shower 4-5 minutes for hair washing. Pt able to complete LE dressing w/ increased time for socks utilizing cross-legged technique. Pt w/ good insight into her capabilities and limitations and independently incorporating pacing. Pt with no further OT/ADL concerns at this time.  (Local son present at end of session). Education: Patient instructed on home safety, body mechanics for optimal respiratory effort, Energy Conservation/Work Simplification Techniques, adaptive strategies and adaptive dressing techniques including clothing modifications with patient verbalizing & demonstrating understanding at this time. Discharge Recommendations: Home Health  Further Equipment Recommendations for Discharge: hospital bed for Aspiration Precautions, shower chair and rollator      SUBJECTIVE:   Patient stated I think my son has already thought of some things to make sure I have in the apartment before I get there.     OBJECTIVE DATA SUMMARY:     Past Medical History:   Diagnosis Date    Dehydration     Dysphagia     Hypokalemia     Malignant neoplasm of fundus of stomach (HCC)      Past Surgical History:   Procedure Laterality Date    HX CHOLECYSTECTOMY      HX GI      peg tube    HX ORTHOPAEDIC       Barriers to Learning/Limitations: None  Compensate with: visual, verbal, tactile, kinesthetic cues/model    Prior Level of Function/Home Situation:   Pt was living in 2 story, but has an apt at [facility] at discharge  [address]: Private residence  [address] Name: [facility] (planning to move into [facility] where spouse is residin)  # Steps to Enter: 0  Wheelchair Ramp: Yes  One/Two Story Residence: Two story  # of Interior Steps: 12  Interior Rails: Both  Lift Chair Available: No  Living Alone: No (reports spouse is resident of [facility] as well)  Support Systems: Assisted living (son states desire is for pt to return to [facility] if able)  Patient Expects to be Discharged to[de-identified] Assisted living  Current DME Used/Available at Home: None  [x]     Right hand dominant   []     Left hand dominant  Cognitive/Behavioral Status:  Neurologic State: Alert; Appropriate for age  Orientation Level: Oriented X4;Appropriate for age  Cognition: Appropriate decision making; Appropriate for age attention/concentration; Appropriate safety awareness; Follows commands  Safety/Judgement: Awareness of environment; Insight into deficits;Good awareness of safety precautions  Skin: appears intact except PEG  Edema: No significant edema noted. Vision/Perceptual:      Appears WFL      Coordination:  Coordination: Within functional limits  Fine Motor Skills-Upper: Left Intact; Right Intact    Gross Motor Skills-Upper: Left Intact; Right Intact  Balance:  Sitting: Intact  Standing: Intact; Without support  Strength:  Strength: Generally decreased, functional  Tone & Sensation:  Tone: Normal  Sensation: Intact  Range of Motion:  AROM: Within functional limits  PROM: Within functional limits  Functional Mobility and Transfers for ADLs:  Bed Mobility:  Supine to Sit: Supervision;Modified independent  Sit to Supine: Supervision  Scooting: Supervision;Modified independent  Transfers:  Sit to Stand: Supervision  Bed to Chair: Supervision   Toilet Transfer : Supervision   Shower Transfer: Supervision   Bathroom Mobility: Supervision/set up  ADL Assessment:  Feeding: Setup    Oral Facial Hygiene/Grooming: Supervision;Modified Independent    Bathing: Setup    Upper Body Dressing: Setup    Lower Body Dressing: Setup; Additional time    Toileting: Supervision;Modified independent     ADL Intervention:  Grooming  Washing Face: Supervision/set-up  Washing Hands: Modified independent  Brushing Teeth: Modified independent  Brushing/Combing Hair: Modified independent    Upper Body Bathing  Position Performed: Standing    Upper Body [address] Gown: Supervision/ set-up    Lower Body Dressing Assistance  Underpants: Supervision/set-up  Socks: Supervision/set-up; Compensatory technique training    Toileting  Toileting Assistance: Supervision/set up;Modified independent  Bladder Hygiene: Modified independent  Clothing Management: Stand-by assistance  Adaptive Equipment: Grab bars    Cognitive Retraining  Safety/Judgement: Awareness of environment; Insight into deficits;Good awareness of safety precautions    Pain:  Pre-treatment: 0/10  Post-treatment: 0/10    Activity Tolerance:   Pt able to stand 5+5+3 minute(s). Pt able to complete ADLs with intermittent rest breaks. Please refer to the flowsheet for vital signs taken during this treatment. After treatment:   []  Patient left in no apparent distress sitting up in chair  [x]  Patient left in no apparent distress in bed  [x]  Call bell left within reach  [x]  Nursing notified  [x]  Caregiver present  []  Bed alarm activated    COMMUNICATION/EDUCATION:   Communication/Collaboration:  [x]      Home safety education was provided and the patient/caregiver indicated understanding. [x]      Patient/family have participated as able and agree with findings and recommendations. []      Patient is unable to participate in plan of care at this time. Thank you for this referral.  Penny De León, OTR/L  Time Calculation: 39 mins    G-Codes (GP)  Self Care   Current  CI= 1-19%   Goal  CI= 1-19%   D/C  CI= 1-19%  The severity rating is based on the professional judgement & direct observation of Level of Assistance required for Functional Mobility and ADLs. Eval Complexity: History: HIGH Complexity : Extensive review of history including physical, cognitive and psychosocial history ; Examination: HIGH Complexity : 5 or more performance deficits relating to physical, cognitive , or psychosocial skils that result in activity limitations and / or participation restrictions; Decision Making:HIGH Complexity : Patient presents with comorbidities that affect occupational performance.  Signifigant modification of tasks or assistance (eg, physical or verbal) with assessment (s) is necessary to enable patient to complete evaluation

## 2017-11-09 NOTE — PROGRESS NOTES
conducted a Follow up consultation and Spiritual Assessment for Ana Lilia Ashby, who is a 80 y.o.,female. Continued the relationship of care and support. Listened empathically. Offered prayer and assurance of continued prayer on patients behalf. Plan:  Chaplains will continue to follow and will provide pastoral care as needed or requested.  recommends bedside caregivers page the  on duty if patient shows signs of acute spiritual or emotional distress. Father AUGUSTUS MarroquinDiv.   [facility] - [facility]

## 2017-11-09 NOTE — PROGRESS NOTES
Occupational Therapy Evaluation/Treatment Attempt    Chart reviewed. Attempted Occupational Therapy Evaluation/Treatment, however, patient unable to be seen due to:  []  Nausea/vomiting  []  Eating  []  Pain  []  Patient too lethargic  []  Off Unit for testing/procedure  []  Dialysis treatment in progress   []  Telemetry Results  [x]  Other: receiving nursing care    Will f/u later as patient's schedule allows.    Thank you for this referral.  Richard Santizo, OTR/L

## 2017-11-09 NOTE — ROUTINE PROCESS
Bedside and Verbal shift change report given to Ignacio Garner RN by Aziza Ruiz RN.  Report included the following information SBAR, Kardex, OR Summary, Intake/Output and MAR

## 2017-11-09 NOTE — PROGRESS NOTES
Placement verified via contrast injection. Catheter flushed with saline. Obstruction resolved. Cath useable with connection area split. Same size cath not available at this time.

## 2017-11-09 NOTE — PROGRESS NOTES
1045: Student nurse called RN for error in tube feed. Attempted to flush J tube, without success. 1 inch cut noted to distal end of tube. Pt states she had tube placed at [facility] 2 weeks prior and that staff at [facility] cut tube. Dr. Tiana Meier notified of situation. 1330:  Repeated attempts made to unclog J tube. FRANCISCA Stack notified and at bedside to evaluate. FRANCISCA Black notified Dr. Tiana Meier - GI consult placed. 1436: IR orders placed to check placement of tube. 1550: Pt returned from IR. J tube flushes successfully. Will restart tube feed once received from dietary.

## 2017-11-10 VITALS
TEMPERATURE: 98 F | HEART RATE: 83 BPM | OXYGEN SATURATION: 97 % | DIASTOLIC BLOOD PRESSURE: 62 MMHG | SYSTOLIC BLOOD PRESSURE: 121 MMHG | RESPIRATION RATE: 18 BRPM | WEIGHT: 182.1 LBS

## 2017-11-10 LAB
ANION GAP SERPL CALC-SCNC: 7 MMOL/L (ref 3–18)
ATRIAL RATE: 124 BPM
BUN SERPL-MCNC: 12 MG/DL (ref 7–18)
BUN/CREAT SERPL: 25 (ref 12–20)
CALCIUM SERPL-MCNC: 7.7 MG/DL (ref 8.5–10.1)
CALCULATED P AXIS, ECG09: 73 DEGREES
CALCULATED R AXIS, ECG10: 47 DEGREES
CALCULATED T AXIS, ECG11: 96 DEGREES
CHLORIDE SERPL-SCNC: 109 MMOL/L (ref 100–108)
CO2 SERPL-SCNC: 27 MMOL/L (ref 21–32)
CREAT SERPL-MCNC: 0.48 MG/DL (ref 0.6–1.3)
DIAGNOSIS, 93000: NORMAL
ERYTHROCYTE [DISTWIDTH] IN BLOOD BY AUTOMATED COUNT: 19.6 % (ref 11.6–14.5)
GLUCOSE SERPL-MCNC: 135 MG/DL (ref 74–99)
HCT VFR BLD AUTO: 27.3 % (ref 35–45)
HGB BLD-MCNC: 8.6 G/DL (ref 12–16)
MAGNESIUM SERPL-MCNC: 1.8 MG/DL (ref 1.6–2.6)
MCH RBC QN AUTO: 28.4 PG (ref 24–34)
MCHC RBC AUTO-ENTMCNC: 31.5 G/DL (ref 31–37)
MCV RBC AUTO: 90.1 FL (ref 74–97)
P-R INTERVAL, ECG05: 96 MS
PHOSPHATE SERPL-MCNC: 2.3 MG/DL (ref 2.5–4.9)
PLATELET # BLD AUTO: 185 K/UL (ref 135–420)
PMV BLD AUTO: 9.4 FL (ref 9.2–11.8)
POTASSIUM SERPL-SCNC: 3.5 MMOL/L (ref 3.5–5.5)
Q-T INTERVAL, ECG07: 318 MS
QRS DURATION, ECG06: 62 MS
QTC CALCULATION (BEZET), ECG08: 456 MS
RBC # BLD AUTO: 3.03 M/UL (ref 4.2–5.3)
SODIUM SERPL-SCNC: 143 MMOL/L (ref 136–145)
VENTRICULAR RATE, ECG03: 124 BPM
WBC # BLD AUTO: 6.8 K/UL (ref 4.6–13.2)

## 2017-11-10 PROCEDURE — 97116 GAIT TRAINING THERAPY: CPT

## 2017-11-10 PROCEDURE — 36415 COLL VENOUS BLD VENIPUNCTURE: CPT | Performed by: HOSPITALIST

## 2017-11-10 PROCEDURE — 74011250637 HC RX REV CODE- 250/637: Performed by: HOSPITALIST

## 2017-11-10 PROCEDURE — 85027 COMPLETE CBC AUTOMATED: CPT | Performed by: HOSPITALIST

## 2017-11-10 PROCEDURE — 80048 BASIC METABOLIC PNL TOTAL CA: CPT | Performed by: HOSPITALIST

## 2017-11-10 PROCEDURE — 84100 ASSAY OF PHOSPHORUS: CPT | Performed by: HOSPITALIST

## 2017-11-10 PROCEDURE — 83735 ASSAY OF MAGNESIUM: CPT | Performed by: HOSPITALIST

## 2017-11-10 PROCEDURE — 74011250636 HC RX REV CODE- 250/636: Performed by: HOSPITALIST

## 2017-11-10 RX ORDER — ERYTHROMYCIN ETHYLSUCCINATE 200 MG/5ML
25 SUSPENSION ORAL
Qty: 100 ML | Refills: 0 | Status: SHIPPED | OUTPATIENT
Start: 2017-11-10

## 2017-11-10 RX ADMIN — SUCRALFATE 1 G: 1 SUSPENSION ORAL at 13:17

## 2017-11-10 RX ADMIN — SODIUM CHLORIDE 125 ML/HR: 900 INJECTION, SOLUTION INTRAVENOUS at 10:22

## 2017-11-10 RX ADMIN — URSODIOL 300 MG: 300 CAPSULE ORAL at 09:41

## 2017-11-10 RX ADMIN — POTASSIUM CHLORIDE 40 MEQ: 20 SOLUTION ORAL at 10:22

## 2017-11-10 RX ADMIN — SUCRALFATE 1 G: 1 SUSPENSION ORAL at 09:41

## 2017-11-10 RX ADMIN — PANTOPRAZOLE SODIUM 40 MG: 40 GRANULE, DELAYED RELEASE ORAL at 07:00

## 2017-11-10 NOTE — PROGRESS NOTES
TRANSFER - OUT REPORT:    Verbal report given to Asher Dos Santos RN (name) on Belia Guillen  being transferred to [facility] (unit) for routine progression of care       Report consisted of patients Situation, Background, Assessment and   Recommendations(SBAR). Information from the following report(s) SBAR, Kardex, Intake/Output, MAR and Recent Results was reviewed with the receiving nurse. Lines:       Opportunity for questions and clarification was provided. Patient transported with:   Registered Nurse walked patient to son's vehicle. Son was transporting patient to [facility].

## 2017-11-10 NOTE — ROUTINE PROCESS
Bedside shift change report given to Winifred Kocher, Rn (oncoming nurse) by Fidelina Farooq Rn (offgoing nurse). Report included the following information SBAR, Kardex, Intake/Output, MAR and Recent Results.

## 2017-11-10 NOTE — PROGRESS NOTES
Patient was visited by LADAN. Volunteer followed up with patient and/or family and reported no needs to this . Chaplains will continue to follow and will provide pastoral care as needed or requested. [address], M.Div.   Board Certified   [phone] - Office

## 2017-11-10 NOTE — PROGRESS NOTES
Problem: Mobility Impaired (Adult and Pediatric)  Goal: *Acute Goals and Plan of Care (Insert Text)  Physical Therapy Goals LT/ST  Initiated 11/9/2017 and to be accomplished within 3-5 day(s)  1. Patient will move from supine <> sit with S/mod I in prep for out of bed activity and change of position. 2.  Patient will perform sit<> stand with S with LRAD in prep for transfers/ambulation. 3.  Patient will transfer from bed <> chair with S with LRAD for time up in chair for completion of ADL activity. 4.  Patient will ambulate 150 feet with S/LRAD for improved functional mobility/safe discharge. Outcome: Progressing Towards Goal  physical Therapy TREATMENT    Patient: Nissa Vee (27 y.o. female)  Date: 11/10/2017  Diagnosis: Dehydration, severe  Dehydration, severe  Lactic acidosis Dehydration, severe       Precautions: Skin, Fall, Aspiration (PEG)  Chart, physical therapy assessment, plan of care and goals were reviewed. ASSESSMENT:  Pt seen in bed w/ IV and peg tube. Peg tub put on hold during session and ran once session was completed. Pt reported no pain at this time. Pt able to ambulate 200 ft w/ GB but demonstrates decrease anastasiia, lateral weight shifting, decrease stride length, and decrease step clearance. Pt initially had to hold on to the furniture to maintain balance while ambulating but as pt continued to ambulate pt was able to maintain dynamic balance w/ CGA. Pt reported decrease activity tolerance and was transferred back to room where pt performed therex activity. During therex pt began to become nauseous and vomitted in the emesis bag. During therex activity and after pt finished vomiting pt reported decrease activity tolerance. Pt was left sitting at the EOB, ice chips given to pt, call bell and tray in reach, nurse notified after session.  Recommend SNF at this time to address pt's decrease activity tolerance and to increase pt's strength and stability so that pt is able to perform ADLs independently and safely upon returning home. Progression toward goals:  [x]      Improving appropriately and progressing toward goals  []      Improving slowly and progressing toward goals  []      Not making progress toward goals and plan of care will be adjusted     PLAN:  Patient continues to benefit from skilled intervention to address the above impairments. Continue treatment per established plan of care. Discharge Recommendations:  [facility]  Further Equipment Recommendations for Discharge:  TBD by rehab     SUBJECTIVE:   Patient stated I better take this bag in case I get sick.     OBJECTIVE DATA SUMMARY:   Critical Behavior:  Neurologic State: Alert  Orientation Level: Oriented X4  Cognition: Appropriate decision making, Appropriate for age attention/concentration, Appropriate safety awareness, Follows commands  Safety/Judgement: Awareness of environment, Insight into deficits, Good awareness of safety precautions  Functional Mobility Training:  Bed Mobility:  Supine to Sit: Modified independent  Scooting: Modified independent;Supervision  Transfers:  Sit to Stand: Supervision  Stand to Sit: Supervision  Balance:  Sitting: Intact  Standing: Intact; Without support  Ambulation/Gait Training:  Distance (ft): 200 Feet (ft)  Assistive Device: Gait belt  Ambulation - Level of Assistance: Contact guard assistance  Gait Abnormalities: Decreased step clearance  Stance: Weight shift  Speed/Estelita: Slow  Step Length: Left shortened;Right shortened  Interventions: Safety awareness training; Tactile cues; Verbal cues  Therapeutic Exercises:       EXERCISE   Sets   Reps   Active Active Assist   Passive Self ROM   Comments   Ankle Pumps 1 10  [x] [] [] []    Quad Sets/Glut Sets   [] [] [] []    Hamstring Sets   [] [] [] []    Short Arc Quads   [] [] [] []    Heel Slides   [] [] [] []    Straight Leg Raises   [] [] [] []    Hip Abd/Add 2 10 [x] [] [] []    Long Arc Quads 2 10 [x] [] [] [] Seated Marching   [] [] [] []    Standing Marching   [] [] [] []       [] [] [] []      Pain:  Pain Scale 1: Numeric (0 - 10)  Pain Intensity 1: 0  Activity Tolerance:   Fair+  Please refer to the flowsheet for vital signs taken during this treatment.   After treatment:   [] Patient left in no apparent distress sitting up in chair  [x] Patient left in no apparent distress in bed  [x] Call bell left within reach  [x] Nursing notified  [] Caregiver present  [] Bed alarm activated      Cyndy Grullon PT   Time Calculation: 22 mins

## 2017-11-10 NOTE — PROGRESS NOTES
D/C Plan: [facility] 11/10/17     Pt's son will transport pt to [facility] today. Please call report to [phone] to ensure a safe transition. Please include all hard scripts for controlled substances, med rec and dc summary in packet.

## 2017-11-10 NOTE — PROGRESS NOTES
NUTRITION FOLLOW-UP    RECOMMENDATIONS/PLAN:   - Continue to increase Tube Feed to goal rate   Monitor Tube Feed tolerance, labs/lyters, skin integrity, wt, fluid status, BM  NUTRITION ASSESSMENT:   Client Update: 80 yrs old Female with dehydration. Per MD note pt has dysphagia and intractable N/V for over 3 weeks. Pt had J tube placed and at home she is on Jevity 1.5 @ 60ml/hr. During hospitalization will change to Jevity 1.2 to meet estimated needs. G-J Tube was uncloged 11/9/17. Still trying to meet goal rate for tube feed. FOOD/NUTRITION INTAKE   Diet Order:  Tube Feed   Supplements: n/a   Food Allergies: NKFA  Average PO Intake:      No data found. Pertinent Medications:  [x] Reviewed; protonix, KCl, carafate  Electrolyte Replacement Protocol: []K []Mg []PO4  Insulin:  []SSI  []Pre-meal   []Basal    []Drip  []None  Cultural/Scientology Food Preferences: None Identified    BIOCHEMICAL DATA & MEDICAL TESTS  Pertinent Labs:  [x] Reviewed; Phos-2.3   ANTHROPOMETRICS  Height:    5'2     Weight: 82.6 kg (182 lb 1.6 oz)         BMI:   33.5kg/m^2 obese (30%-39.9% BMI)   Adm Weight: 182 lbs                Weight change: 0lbs  Adjusted Body Weight: 58.2kg     NUTRITION-FOCUSED PHYSICAL ASSESSMENT  Skin: No pressure injury noted      GI: +BM 11/9/17    NUTRITION PRESCRIPTION  Calories: 8261-5671 kcal/day based on 25-35kcal/kg adjBW  Protein:  g/day based on 1.0-1.5g/kg  Fluid: 8563-3123 ml/day based on 1 kcal/ml     NUTRITION DIAGNOSES:   1. Inadequate oral intake related to dysphagia as evidence by J-tube w/ EN feedings. NUTRITION INTERVENTIONS:   INTERVENTIONS:        GOALS:  1. Other:continue to increase to goal rate  1. Meet tube feed goal rate by next review 3 days             LEARNING NEEDS (Diet, Supplementation, Food/Nutrient-Drug Interaction):   [x] None Identified   [] Education provided/documented      Identified and patient: [] Declined   [] Was not appropriate/indicated        NUTRITION MONITORING /EVALUATION:   Adjust EN/PN as appropriate  Monitor wt  Monitor renal labs, electrolytes, fluid status     Previous Recommendations Implemented: no        Previous Goals Met:  no -Tube Feed not at goal rate      [] Participated in Interdisciplinary Rounds    [x] Interdisciplinary Care Plan Reviewed  DISCHARGE NUTRITION RECOMMENDATIONS ADDRESSED:     [x] Yes- recommended Tube Feed-Jevity 1.5 @ 60ml/hr diet diet    NUTRITION RISK:           [x] At risk                        [] Not currently at risk        Will follow-up per policy.   Jeovany Paul, FLORENTINO  PAGER:  [phone]

## 2017-11-10 NOTE — DISCHARGE SUMMARY
Discharge Summary    Patient: Abe Andrade MRN: [id number]  CSN: [id number]    YOB: 1932  Age: 80 y.o. Sex: female    DOA: 11/7/2017 LOS:  LOS: 3 days   Discharge Date:      Primary Care Provider:  Saleem Evans MD    Admission Diagnoses: Dehydration, severe  Dehydration, severe  Lactic acidosis    Discharge Diagnoses:    Problem List as of 11/10/2017  Never Reviewed          Codes Class Noted - Resolved    * (Principal)Dehydration, severe ICD-10-CM: E86.0  ICD-9-CM: 276.51  11/7/2017 - Present        Lactic acidosis ICD-10-CM: E87.2  ICD-9-CM: 276.2  11/7/2017 - Present        UTI (urinary tract infection) ICD-10-CM: N39.0  ICD-9-CM: 599.0  11/7/2017 - Present        Adenocarcinoma of stomach ([facility] ICD-10-CM: C16.9  ICD-9-CM: 151.9  11/7/2017 - Present        Metastasis to liver [facility]) ICD-10-CM: C78.7  ICD-9-CM: 197.7  11/7/2017 - Present        Moderate protein-calorie malnutrition ([facility] ICD-10-CM: E44.0  ICD-9-CM: 263.0  11/7/2017 - Present        Intractable nausea and vomiting ICD-10-CM: R11.2  ICD-9-CM: 536.2  11/7/2017 - Present              Discharge Medications:     Current Discharge Medication List      START taking these medications    Details   erythromycin (E.E.S.) 200 mg/5 mL suspension Take 0.63 mL by mouth three (3) times daily (with meals). Qty: 100 mL, Refills: 0         CONTINUE these medications which have NOT CHANGED    Details   Lactose-Free Food with Fiber (JEVITY 1.5 SUBHA) 0.06 gram-1.5 kcal/mL liqd Take  by mouth. HOMEOPATHIC DRUGS (PHOSPHORUS PO) Take  by mouth.      promethazine (PHENERGAN) 12.5 mg tablet Take 12.5 mg by mouth two (2) times a day. sucralfate (CARAFATE) 100 mg/mL suspension Take 1 g by mouth four (4) times daily. calcium carbonate (TUMS FRESHERS) 200 mg calcium (500 mg) chew Take 1 Tab by mouth two (2) times a day. ursodiol (ACTIGALL) 300 mg capsule Take 300 mg by mouth daily.       ondansetron (ZOFRAN ODT) 8 mg disintegrating tablet Take 8 mg by mouth every eight (8) hours as needed for Nausea. RABEprazole (ACIPHEX) 20 mg tablet Take 10 mg by mouth daily. Discharge Condition: Good    Procedures : reopening of clogged GJ tube by IR. Consults: IR      PHYSICAL EXAM   Visit Vitals    /62    Pulse 83    Temp 98 °F (36.7 °C)    Resp 18    Wt 82.6 kg (182 lb 1.6 oz)    SpO2 97%    Breastfeeding No     General: Awake, cooperative, no acute distress    HEENT: NC, Atraumatic. PERRLA, EOMI. Anicteric sclerae. Lungs:  CTA Bilaterally. No Wheezing/Rhonchi/Rales. Heart:  Regular  rhythm,  No murmur, No Rubs, No Gallops  Abdomen: Soft, Non distended, Non tender. +Bowel sounds,   Extremities: No c/c/e  Psych:   Not anxious or agitated. Neurologic:  No acute neurological deficits. Admission HPI : Gregorio Munguia is a 80 y.o. 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 Recently discharged from [facility] to [facility]. She had dysphagia and intractable N/V for over 3 weeks. She had J tube placed and is on jevity 1.2 at goal rate of 60 cc/hr. Patient reports that she continued to have N/V after discharge from hospital. Patient reports that this morning she got up to go to bathroom and she had syncopal episode, the next thing she remembers was she is on floor. She vomited later. She denies any chest pain, SOB, fever/chills, LOC of bowel or bladder. She was brought to ER. She denies any other symmptoms. In ER she is noted to be dehydrated. Head CT negative. Her WBC elevated, UA with concern for UTI. She is being admitted for further management.        Hospital Course :   Intractable nausea and vomiting - discussed with patient's son who is Gastroenterologist in [facility], he recommended low dose erythromycin and homeopathic medication(patient supplied). Patient continued to have nausea vomiting but it was not as significant amount.  She is at baseline per son. We continued her tube feedings at goal rate of 60cc/hr, her G-J tube clogged and it was opened up by IR using stiff amplatz wire and forceful contrast. Recommended adequate flush after medications. Syncopal episode - likely secondary to dehydration, orthostatic hypotension. CT head with no acute findings. UTI -  received 3 days of ceftriaxone, E-faecalis on urine cultures. 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 Due to advance stage of ca she is not a candidate for surgery. Followed by Dr. Audrey Hirsch. Planned for palliative chemo and radiation after she is nutritionally optimized. I discussed with son at bedside. He is ok with transfer back to the [facility]. Activity: Activity as tolerated    Diet: Resume previous diet and tube feeding. Follow-up: PCP, heme/onc    Disposition: SNF    Minutes spent on discharge: 45       Labs: Results:       Chemistry Recent Labs      11/10/17   0325  11/09/17   0316  11/08/17   0406   GLU  135*  144*  163*   NA  143  145  142   K  3.5  2.8*  3.5   CL  109*  109*  109*   CO2  27  26  26   BUN  12  21*  28*   CREA  0.48*  0.49*  0.62   CA  7.7*  7.7*  7.9*   AGAP  7  10  7   BUCR  25*  43*  45*      CBC w/Diff Recent Labs      11/10/17   0325  11/09/17   0316  11/08/17   0406   WBC  6.8  7.8  8.9   RBC  3.03*  2.98*  3.13*   HGB  8.6*  8.3*  8.7*   HCT  27.3*  27.0*  27.9*   PLT  185  169  172      Cardiac Enzymes No results for input(s): CPK, CKND1, CARLOS in the last 72 hours. No lab exists for component: CKRMB, TROIP   Coagulation No results for input(s): PTP, INR, APTT in the last 72 hours. No lab exists for component: INREXT    Lipid Panel No results found for: CHOL, CHOLPOCT, CHOLX, CHLST, CHOLV, 185887, HDL, LDL, LDLC, DLDLP, 897840, VLDLC, VLDL, TGLX, TRIGL, TRIGP, TGLPOCT, CHHD, CHHDX   BNP No results for input(s): BNPP in the last 72 hours.    Liver Enzymes No results for input(s): TP, ALB, TBIL, AP, SGOT, GPT in the last 72 hours. No lab exists for component: DBIL   Thyroid Studies No results found for: T4, T3U, TSH, TSHEXT         Significant Diagnostic Studies: Ct Head Wo Cont    Result Date: 11/7/2017  EXAM: CT head INDICATION: Syncopal event. COMPARISON: None. TECHNIQUE: Axial CT imaging of the head was performed without intravenous contrast. One or more dose reduction techniques were used on this CT: automated exposure control, adjustment of the mAs and/or kVp according to patient's size, and iterative reconstruction techniques. The specific techniques utilized on this CT exam have been documented in the patient's electronic medical record. _______________ FINDINGS: BRAIN AND POSTERIOR FOSSA: There is mild cortical volume loss. Ventricular size and configuration is within normal limits. Basilar cisterns are patent. Subcortical and periventricular white matter hypoattenuation is noted. There is no intracranial hemorrhage, mass effect, or midline shift. Gray-white matter differentiation is within normal limits. EXTRA-AXIAL SPACES AND MENINGES: There are no abnormal extra-axial fluid collections. CALVARIUM: Intact. SINUSES: Clear. OTHER: Atherosclerotic calcification of the carotid siphons is demonstrated. _______________     IMPRESSION: 1. No acute intracranial abnormality. 2. Subcortical and periventricular white matter hypoattenuation; a nonspecific finding, likely reflective of chronic ischemic microvascular change. Xr Chest Port    Result Date: 11/7/2017  Chest, single view Indication: Witnessed syncopal event. Comparison: None Findings:  Portable upright AP view of the chest was obtained. The cardiomediastinal silhouette is within normal limits. The pulmonary vasculature is unremarkable. Lung parenchyma is well aerated, without focal consolidation. No pleural effusion nor pneumothorax. No acute osseous abnormality. Impression: No radiographic evidence of an acute abnormality.     Ir Inj Fluoro Exam G/colon Tube    Result Date: 11/10/2017  EXAM: Fluoroscopic gastrojejunostomy tube check INDICATION: Nonfunctioning gastrojejunostomy tube, clogged, history of adenocarcinoma COMPARISON: None. Radiation dose (reference air kerma): 62 mGy _______________ Technique/findings: Prior to the examination, risks, benefits and alternatives of the procedure were explained to the patient for tube change and potential exchange. Signed written consent was obtained from the patient. Standard departmental preprocedure timeout was called. GUIDANCE: Fluoroscopy guidance was used to position (and confirm the position of) the wire and contrast injection. Image(s) saved in PACS: Fluoroscopy The patient was placed supine on the fluoroscopy table, with the right abdomen prepped and draped in a sterile fashion. Visual inspection of the gastrojejunostomy site demonstrated no evidence of dermal induration or infection. The hub of the gastrojejunostomy tube was noted to be split, as reported by the patient's floor nurse. Under direct fluoroscopic observation a stiff Amplatz wire was inserted into the gastrojejunostomy tube, with resistance at the distal most portion of the GJ tube. Numerous attempts were made to pass the Amplatz wire through the portion of the tube, without success. This finding would be in keeping with history of a clogged tube likely from administered medication, with suboptimal post administration flushing. At this time a catheter tip 50 cc syringe with diluted contrast was inserted into the hub, with unsuccessful forceful attempts at injection. I reinserted the Amplatz wire with additional attempts to break up the occluding material in the distal catheter. I again placed the 50 cc catheter tip syringe containing diluted contrast with forceful injection, which was successful in dislodging the distal occluding material, with contrast opacifying the adjacent small bowel distal to the GJ tube tip.  No findings of extravasation suggest perforation. No post procedure complications. A standard departmental post processing was called. The patient was returned to her room. _______________     IMPRESSION: 1. Successful fluoroscopic guided disimpaction of the distal GJ tube occluding material, and documented patency with contrast injection under fluoroscopy. No results found for this or any previous visit.         CC: Elliot Monahan MD

## 2017-11-13 LAB
BACTERIA SPEC CULT: NORMAL
SERVICE CMNT-IMP: NORMAL